# Patient Record
Sex: FEMALE | Race: WHITE | NOT HISPANIC OR LATINO | Employment: FULL TIME | URBAN - METROPOLITAN AREA
[De-identification: names, ages, dates, MRNs, and addresses within clinical notes are randomized per-mention and may not be internally consistent; named-entity substitution may affect disease eponyms.]

---

## 2023-06-07 LAB
ALANINE AMINOTRANSFERASE (SGPT) (U/L) IN SER/PLAS: 19 U/L (ref 7–45)
ALBUMIN (G/DL) IN SER/PLAS: 4.4 G/DL (ref 3.4–5)
ALKALINE PHOSPHATASE (U/L) IN SER/PLAS: 61 U/L (ref 33–110)
ANION GAP IN SER/PLAS: 11 MMOL/L (ref 10–20)
ASPARTATE AMINOTRANSFERASE (SGOT) (U/L) IN SER/PLAS: 18 U/L (ref 9–39)
BASOPHILS (10*3/UL) IN BLOOD BY AUTOMATED COUNT: 0.02 X10E9/L (ref 0–0.1)
BASOPHILS/100 LEUKOCYTES IN BLOOD BY AUTOMATED COUNT: 0.4 % (ref 0–2)
BILIRUBIN TOTAL (MG/DL) IN SER/PLAS: 0.7 MG/DL (ref 0–1.2)
C PEPTIDE (NG/ML) IN SER/PLAS: 2.3 NG/ML (ref 0.7–3.9)
CALCIDIOL (25 OH VITAMIN D3) (NG/ML) IN SER/PLAS: 31 NG/ML
CALCIUM (MG/DL) IN SER/PLAS: 9.7 MG/DL (ref 8.6–10.6)
CARBON DIOXIDE, TOTAL (MMOL/L) IN SER/PLAS: 28 MMOL/L (ref 21–32)
CHLORIDE (MMOL/L) IN SER/PLAS: 105 MMOL/L (ref 98–107)
CHOLESTEROL (MG/DL) IN SER/PLAS: 173 MG/DL (ref 0–199)
CHOLESTEROL IN HDL (MG/DL) IN SER/PLAS: 91.6 MG/DL
CHOLESTEROL/HDL RATIO: 1.9
COBALAMIN (VITAMIN B12) (PG/ML) IN SER/PLAS: 303 PG/ML (ref 211–911)
CREATININE (MG/DL) IN SER/PLAS: 0.62 MG/DL (ref 0.5–1.05)
EOSINOPHILS (10*3/UL) IN BLOOD BY AUTOMATED COUNT: 0.13 X10E9/L (ref 0–0.7)
EOSINOPHILS/100 LEUKOCYTES IN BLOOD BY AUTOMATED COUNT: 2.5 % (ref 0–6)
ERYTHROCYTE DISTRIBUTION WIDTH (RATIO) BY AUTOMATED COUNT: 13.5 % (ref 11.5–14.5)
ERYTHROCYTE MEAN CORPUSCULAR HEMOGLOBIN CONCENTRATION (G/DL) BY AUTOMATED: 31 G/DL (ref 32–36)
ERYTHROCYTE MEAN CORPUSCULAR VOLUME (FL) BY AUTOMATED COUNT: 88 FL (ref 80–100)
ERYTHROCYTES (10*6/UL) IN BLOOD BY AUTOMATED COUNT: 4.74 X10E12/L (ref 4–5.2)
ESTIMATED AVERAGE GLUCOSE FOR HBA1C: 97 MG/DL
FERRITIN (UG/LL) IN SER/PLAS: 15 UG/L (ref 8–150)
FOLATE (NG/ML) IN SER/PLAS: 14.9 NG/ML
GFR FEMALE: >90 ML/MIN/1.73M2
GLUCOSE (MG/DL) IN SER/PLAS: 78 MG/DL (ref 74–99)
HEMATOCRIT (%) IN BLOOD BY AUTOMATED COUNT: 41.6 % (ref 36–46)
HEMOGLOBIN (G/DL) IN BLOOD: 12.9 G/DL (ref 12–16)
HEMOGLOBIN A1C/HEMOGLOBIN TOTAL IN BLOOD: 5 %
IMMATURE GRANULOCYTES/100 LEUKOCYTES IN BLOOD BY AUTOMATED COUNT: 0.2 % (ref 0–0.9)
IRON (UG/DL) IN SER/PLAS: 116 UG/DL (ref 35–150)
IRON BINDING CAPACITY (UG/DL) IN SER/PLAS: 521 UG/DL (ref 240–445)
IRON SATURATION (%) IN SER/PLAS: 22 % (ref 25–45)
LDL: 70 MG/DL (ref 0–99)
LEUKOCYTES (10*3/UL) IN BLOOD BY AUTOMATED COUNT: 5.1 X10E9/L (ref 4.4–11.3)
LYMPHOCYTES (10*3/UL) IN BLOOD BY AUTOMATED COUNT: 1.46 X10E9/L (ref 1.2–4.8)
LYMPHOCYTES/100 LEUKOCYTES IN BLOOD BY AUTOMATED COUNT: 28.5 % (ref 13–44)
MONOCYTES (10*3/UL) IN BLOOD BY AUTOMATED COUNT: 0.35 X10E9/L (ref 0.1–1)
MONOCYTES/100 LEUKOCYTES IN BLOOD BY AUTOMATED COUNT: 6.8 % (ref 2–10)
NEUTROPHILS (10*3/UL) IN BLOOD BY AUTOMATED COUNT: 3.15 X10E9/L (ref 1.2–7.7)
NEUTROPHILS/100 LEUKOCYTES IN BLOOD BY AUTOMATED COUNT: 61.6 % (ref 40–80)
NRBC (PER 100 WBCS) BY AUTOMATED COUNT: 0 /100 WBC (ref 0–0)
PARATHYRIN INTACT (PG/ML) IN SER/PLAS: 46.2 PG/ML (ref 18.5–88)
PLATELETS (10*3/UL) IN BLOOD AUTOMATED COUNT: 262 X10E9/L (ref 150–450)
POTASSIUM (MMOL/L) IN SER/PLAS: 4.6 MMOL/L (ref 3.5–5.3)
PROTEIN TOTAL: 7.1 G/DL (ref 6.4–8.2)
SODIUM (MMOL/L) IN SER/PLAS: 139 MMOL/L (ref 136–145)
TRIGLYCERIDE (MG/DL) IN SER/PLAS: 59 MG/DL (ref 0–149)
UREA NITROGEN (MG/DL) IN SER/PLAS: 14 MG/DL (ref 6–23)
VLDL: 12 MG/DL (ref 0–40)

## 2023-06-10 LAB
COPPER: 123.2 UG/DL (ref 80–155)
ZINC,SERUM OR PLASMA: 64.8 UG/DL (ref 60–120)

## 2023-06-12 LAB
VITAMIN B1, WHOLE BLOOD: 112 NMOL/L (ref 70–180)
VITAMIN B6: 31.2 NMOL/L (ref 20–125)
VITAMIN K: 0.41 NMOL/L (ref 0.22–4.88)

## 2024-04-29 ENCOUNTER — TELEPHONE (OUTPATIENT)
Dept: SURGERY | Facility: CLINIC | Age: 44
End: 2024-04-29
Payer: COMMERCIAL

## 2024-04-29 NOTE — TELEPHONE ENCOUNTER
Non-surgical patient looking to schedule with MAY Dianelys Laureano, will try calling the patient again at a later time.

## 2024-05-02 ENCOUNTER — OFFICE VISIT (OUTPATIENT)
Dept: SURGERY | Facility: CLINIC | Age: 44
End: 2024-05-02
Payer: COMMERCIAL

## 2024-05-02 VITALS
OXYGEN SATURATION: 98 % | WEIGHT: 160.38 LBS | SYSTOLIC BLOOD PRESSURE: 133 MMHG | BODY MASS INDEX: 26.72 KG/M2 | HEIGHT: 65 IN | HEART RATE: 80 BPM | RESPIRATION RATE: 16 BRPM | DIASTOLIC BLOOD PRESSURE: 82 MMHG | TEMPERATURE: 97 F

## 2024-05-02 DIAGNOSIS — Z86.39 H/O: OBESITY: ICD-10-CM

## 2024-05-02 DIAGNOSIS — K91.2 INTESTINAL MALABSORPTION FOLLOWING GASTRECTOMY (HHS-HCC): Primary | ICD-10-CM

## 2024-05-02 DIAGNOSIS — Z98.84 S/P GASTRIC BYPASS: ICD-10-CM

## 2024-05-02 DIAGNOSIS — Z90.3 INTESTINAL MALABSORPTION FOLLOWING GASTRECTOMY (HHS-HCC): Primary | ICD-10-CM

## 2024-05-02 DIAGNOSIS — E66.3 OVERWEIGHT (BMI 25.0-29.9): ICD-10-CM

## 2024-05-02 DIAGNOSIS — R63.2 POLYPHAGIA: ICD-10-CM

## 2024-05-02 PROCEDURE — 99214 OFFICE O/P EST MOD 30 MIN: CPT

## 2024-05-02 RX ORDER — SEMAGLUTIDE 0.5 MG/.5ML
0.5 INJECTION, SOLUTION SUBCUTANEOUS
Qty: 2 ML | Refills: 0 | Status: SHIPPED
Start: 2024-05-05 | End: 2024-05-23 | Stop reason: DRUGHIGH

## 2024-05-02 NOTE — PATIENT INSTRUCTIONS
Congratulations on your weight loss!    The following are the recommendations we discussed at your appointment today:    1. Nutrition  - Please see the dietician BEFORE our next visit.   (You will need to have this visit before we can add weight loss medications).  * Dietitian Information:   * Robin/Cass: Hodan: [phone]  * Mary Jo: Lisa: [phone]  * Anabela: Rafia: [phone]  - Aim for 60 grams of protein and 60 oz of water daily.  - Follow Pouch Rules; handout given today.   - Eat 5 servings of fruits and veggies daily.  - Eat 3 small meals and 1-2 healthy, protein rich, snacks per day.  - Aim for 1456-0814 calories per day  - Aim for <50 grams of carbs per day  - Keep a food diary and log every single thing you eat, every day.    2. Exercise Recommendations:   - Aim for 30 minutes per day, 5 days per week to start, with progression over several weeks to more vigorous intensity.   - Emphasize increasing duration, rather than intensity initially.   - Moderate-intensity physical activity includes:  * Brisk Walking  * Biking (slower than 10 MPH)  * Water Aerobics  * Vigorous housework (washing windows, vacuuming, mopping)  * Mowing the lawn (push mower)  * Gardening  * Ballroom dancing    3. Fluids  - Drink at least 60 oz of water every day.   - Wait 30 minutes before or after meals to drink fluids.  - Avoid carbonated beverages.    4. Vitamins  - Remember to take your multivitamins 2 times daily, once in the morning and once in the evening  - Take your calcium 2-3 times daily, at least 2 hours apart from the multivitamin    5. Come to support groups!   - View the schedule to find a time and location.     6. Labs  - We will check labs today and call with abnormal values.   - We will send prescriptions for abnormal values.   - A copy of the results can be viewed on [facility] portal.   - If you are not on the [facility] health portal, a copy will be mailed to your home.    7. Anti-Obesity Medications  - Implement  "the above dietary and exercise recommendations  - Weight Loss Medication:  You have been prescribed Wegovy for weight loss.   Wegovy is a Glucagon-like peptide-1 (GLP-1) receptor agonist indicated as an adjunct to a reduced calorie diet and increased physical activity for chronic weight management in adults.   This medication works by decreasing the appetite and increased feeling of fullness.   Administer Wegovy once weekly, on the same day each week, at any time of day, with or without meals.   - Inject subcutaneously in the abdomen, thigh or upper arm.  - The initial dose for Wegovy is 0.25 mg weekly x 4 weeks.   - In four week intervals, your dose of Wegovy will be increased until a dose of 2.4mg weekly is achieved.       - Inject 0.25 mg once weekly X 4 weeks (week 1 - 4)     - Inject 0.5 mg once weekly X 4 weeks (week 5 - 8)     - Inject 1.0 mg once weekly X 4 weeks  (week 9 - 12)     - Inject 1.7 mg once weekly X 4 weeks  (week 13 - 16)     - Inject 2.4 mg once weekly X 4 weeks  (week 17 and on)   Possible Side Effects Include: Nausea, Diarrhea, Vomiting, Constipation, Abdominal Pain, Headache, Fatigue, Dyspepsia, Dizziness, Abdominal Distention, Hypoglycemia (in patients with Type II Diabetes), flatulence, gastroenteritis, and GERD.     When injecting the medication, find a place on your abdomen or thigh that you can pinch some adipose tissue.   Cleanse the area with soap and water or alcohol before injecting.  When you inject, make sure the window is facing you. As you press down you will hear an initial \"click.\" Watch the window to view the yellow plunger. Once the plunger has gone down, the pen will click a second time. Wait about 3 seconds after the second click before releasing pressure. Discard pen after each use.     Follow-up in 3 months.   To schedule an appointment call: [phone]           "

## 2024-05-02 NOTE — PROGRESS NOTES
BARIATRIC SURGERY CLINIC  FOLLOW UP NOTE      Name: Mary Campa  MRN: [id number]    Index Surgery  Date of Surgery: 07/25/2017   Surgeon: Anurag    Surgical Procedure: Laparoscopic aki en y gastric bypass 29179  Annual blood work: 06/7/23    HPI:   Presenting for follow up visit.  Was treated with Wegovy 2.4 X 1 year. She lost about 35-40 lbs.     Diet Regular (low carbs). Reports having 1400 marlene/day with 60-65 gr of protein, 140ish gr of carbs. Drinks around 1 gallon of water. Reports not maintaining 30-30-30 rule. Reports feeling restrictions with certain meals (pasta sits heavy in her stomach). Reports dumping couple times a year.    Exercise every day: walking [id number] steps a day, gym: cardio and weight training 3 times a week.    Concerns related to:  Nausea/Vomiting, Reflux: denies  Abdominal Pain: denies  Diarrhea/Constipation denies    DAILY SUPPLEMENTS:  Calcium: Calcium Citrate w/ vitamin D (1200 - 1500mg)  - w/o Vitamin D  Multivitamin & Minerals: 2 per day - Centrum women's MVI gummies 4 count  Vitamin B12: 500 mcg/day  - spray daily  Vitamin D3: 3000 units - denies  Iron/Other: Biotin  PPI:denies      Current Outpatient Medications   Medication Sig Dispense Refill    [START ON 5/5/2024] semaglutide, weight loss, (Wegovy) 0.5 mg/0.5 mL pen injector Inject 0.5 mg under the skin 1 (one) time per week for 28 days. Inject 0.5 mg subcutaneously every week 2 mL 0     No current facility-administered medications for this visit.       Comorbidities:  Patient Active Problem List   Diagnosis    H/O bariatric surgery    Intestinal malabsorption following gastrectomy ([facility])    Overweight (BMI 25.0-29.9)    Polyphagia    S/P gastric bypass    H/O: obesity         REVIEW OF SYSTEMS:  CONSTITUTIONAL: Patient denies fevers, chills, sweats and weight changes.  CARDIOVASCULAR: Patient denies chest pains, palpitations, orthopnea and paroxysmal nocturnal dyspnea.  RESPIRATORY: No dyspnea on exertion, no  "wheezing or cough.  GI: No nausea, vomiting, diarrhea, constipation, abdominal pain, hematochezia or melena.  MUSCULOSKELETAL: No myalgias or arthralgias.  NEUROLOGIC: No chronic headaches, no seizures. Patient denies numbness, tingling or weakness.  PSYCHIATRIC: Patient denies problems with mood disturbance. No problems with anxiety.  ENDOCRINE: No excessive urination or excessive thirst.  DERMATOLOGIC: Patient denies any rashes or skin changes.    PHYSICAL EXAM:  /82 (BP Location: Right arm, Patient Position: Sitting, BP Cuff Size: Adult)   Pulse 80   Temp 36.1 °C (97 °F)   Resp 16   Ht 1.651 m (5' 5\")   Wt 72.7 kg (160 lb 6.2 oz)   SpO2 98%   BMI 26.69 kg/m²   Alert, well appearing, no acute distress, nourished, hydrated.  Anicteric sclera, no ptosis  Facial symmetry  Neck supple  Unlabored respirations.  Easily conversant without increased respiratory effort  Oriented to person, place, time.  Judgement intact.  Appropriate mood, affect.       ASSESSMENT & PLAN:  43 y.o. female presenting for follow up visit s/p bariatric surgery.    Current Weight:     Wt Readings from Last 1 Encounters:   05/02/24 72.7 kg (160 lb 6.2 oz)       Diagnoses and all orders for this visit:  Intestinal malabsorption following gastrectomy ([facility])  -     Zinc, Serum or Plasma; Future  -     Vitamin B12; Future  -     Vitamin A; Future  -     Vitamin K; Future  -     TSH with reflex to Free T4 if abnormal; Future  -     Ferritin; Future  -     Vitamin B6; Future  -     Iron and TIBC; Future  -     Lipid Panel; Future  -     Comprehensive Metabolic Panel; Future  -     Vitamin D 25-Hydroxy,Total (for eval of Vitamin D levels); Future  -     Parathyroid Hormone, Intact; Future  -     Vitamin B1, Whole Blood; Future  -     Folate; Future  -     Copper, Blood; Future  -     CBC; Future  Overweight (BMI 25.0-29.9)  -     semaglutide, weight loss, (Wegovy) 0.5 mg/0.5 mL pen injector; Inject 0.5 mg under the skin 1 (one) time per " week for 28 days. Inject 0.5 mg subcutaneously every week  S/P gastric bypass  -     semaglutide, weight loss, (Wegovy) 0.5 mg/0.5 mL pen injector; Inject 0.5 mg under the skin 1 (one) time per week for 28 days. Inject 0.5 mg subcutaneously every week  Polyphagia  -     semaglutide, weight loss, (Wegovy) 0.5 mg/0.5 mL pen injector; Inject 0.5 mg under the skin 1 (one) time per week for 28 days. Inject 0.5 mg subcutaneously every week  H/O: obesity  -     semaglutide, weight loss, (Wegovy) 0.5 mg/0.5 mL pen injector; Inject 0.5 mg under the skin 1 (one) time per week for 28 days. Inject 0.5 mg subcutaneously every week      Doing well with diet and exercise.   Current BMI: 26  Recommend attending support groups    Mary used to be managed with Wegovy 2.4 mg once weekly. Denied any adverse effects.  However, her last dose administration was > 1 month ago.  Will have to re-start from a small dose 0.5 mg once weekly.  Denies personal or family hx of MTC or Pancreatitis.   Risks and benefits discussed.     > 50% of time spent counseling on the importance of following recommended dietary modifications including: role of diet, low calorie and carbohydrate restrictions, limiting fast food and avoiding high sugary beverages.   Also discussed, the role of exercise with an ultimate goal of at least 250-300 minutes a week for weight loss and weight maintenance.    Follow-up in 3 month.

## 2024-05-22 ENCOUNTER — PATIENT MESSAGE (OUTPATIENT)
Dept: SURGERY | Facility: CLINIC | Age: 44
End: 2024-05-22
Payer: COMMERCIAL

## 2024-05-22 DIAGNOSIS — Z86.39 H/O: OBESITY: Primary | ICD-10-CM

## 2024-05-22 DIAGNOSIS — E66.3 OVERWEIGHT (BMI 25.0-29.9): ICD-10-CM

## 2024-05-23 RX ORDER — SEMAGLUTIDE 1 MG/.5ML
1 INJECTION, SOLUTION SUBCUTANEOUS
Qty: 2 ML | Refills: 0 | Status: SHIPPED | OUTPATIENT
Start: 2024-05-26 | End: 2024-06-23

## 2024-05-29 ENCOUNTER — LAB (OUTPATIENT)
Dept: LAB | Facility: LAB | Age: 44
End: 2024-05-29
Payer: COMMERCIAL

## 2024-05-29 DIAGNOSIS — K91.2 INTESTINAL MALABSORPTION FOLLOWING GASTRECTOMY (HHS-HCC): ICD-10-CM

## 2024-05-29 DIAGNOSIS — Z90.3 INTESTINAL MALABSORPTION FOLLOWING GASTRECTOMY (HHS-HCC): ICD-10-CM

## 2024-05-29 LAB
25(OH)D3 SERPL-MCNC: 40 NG/ML (ref 30–100)
ALBUMIN SERPL BCP-MCNC: 4.3 G/DL (ref 3.4–5)
ALP SERPL-CCNC: 52 U/L (ref 33–110)
ALT SERPL W P-5'-P-CCNC: 22 U/L (ref 7–45)
ANION GAP SERPL CALC-SCNC: 14 MMOL/L (ref 10–20)
AST SERPL W P-5'-P-CCNC: 20 U/L (ref 9–39)
BILIRUB SERPL-MCNC: 0.4 MG/DL (ref 0–1.2)
BUN SERPL-MCNC: 14 MG/DL (ref 6–23)
CALCIUM SERPL-MCNC: 9.4 MG/DL (ref 8.6–10.6)
CHLORIDE SERPL-SCNC: 102 MMOL/L (ref 98–107)
CHOLEST SERPL-MCNC: 194 MG/DL (ref 0–199)
CHOLESTEROL/HDL RATIO: 2
CO2 SERPL-SCNC: 26 MMOL/L (ref 21–32)
CREAT SERPL-MCNC: 0.54 MG/DL (ref 0.5–1.05)
EGFRCR SERPLBLD CKD-EPI 2021: >90 ML/MIN/1.73M*2
ERYTHROCYTE [DISTWIDTH] IN BLOOD BY AUTOMATED COUNT: 14 % (ref 11.5–14.5)
FERRITIN SERPL-MCNC: 26 NG/ML (ref 8–150)
FOLATE SERPL-MCNC: 11.4 NG/ML
GLUCOSE SERPL-MCNC: 84 MG/DL (ref 74–99)
HCT VFR BLD AUTO: 36.9 % (ref 36–46)
HDLC SERPL-MCNC: 96.4 MG/DL
HGB BLD-MCNC: 11.2 G/DL (ref 12–16)
IRON SATN MFR SERPL: ABNORMAL %
IRON SERPL-MCNC: 43 UG/DL (ref 35–150)
LDLC SERPL CALC-MCNC: 87 MG/DL
MCH RBC QN AUTO: 25.4 PG (ref 26–34)
MCHC RBC AUTO-ENTMCNC: 30.4 G/DL (ref 32–36)
MCV RBC AUTO: 84 FL (ref 80–100)
NON HDL CHOLESTEROL: 98 MG/DL (ref 0–149)
NRBC BLD-RTO: 0 /100 WBCS (ref 0–0)
PLATELET # BLD AUTO: 335 X10*3/UL (ref 150–450)
POTASSIUM SERPL-SCNC: 3.9 MMOL/L (ref 3.5–5.3)
PROT SERPL-MCNC: 7.1 G/DL (ref 6.4–8.2)
PTH-INTACT SERPL-MCNC: 87.9 PG/ML (ref 18.5–88)
RBC # BLD AUTO: 4.41 X10*6/UL (ref 4–5.2)
SODIUM SERPL-SCNC: 138 MMOL/L (ref 136–145)
TIBC SERPL-MCNC: ABNORMAL UG/DL
TRIGL SERPL-MCNC: 54 MG/DL (ref 0–149)
TSH SERPL-ACNC: 1.05 MIU/L (ref 0.44–3.98)
UIBC SERPL-MCNC: >450 UG/DL (ref 110–370)
VIT B12 SERPL-MCNC: 206 PG/ML (ref 211–911)
VLDL: 11 MG/DL (ref 0–40)
WBC # BLD AUTO: 4.4 X10*3/UL (ref 4.4–11.3)

## 2024-05-29 PROCEDURE — 84207 ASSAY OF VITAMIN B-6: CPT

## 2024-05-29 PROCEDURE — 83540 ASSAY OF IRON: CPT

## 2024-05-29 PROCEDURE — 36415 COLL VENOUS BLD VENIPUNCTURE: CPT

## 2024-05-29 PROCEDURE — 80053 COMPREHEN METABOLIC PANEL: CPT

## 2024-05-29 PROCEDURE — 80061 LIPID PANEL: CPT

## 2024-05-29 PROCEDURE — 84597 ASSAY OF VITAMIN K: CPT

## 2024-05-29 PROCEDURE — 82746 ASSAY OF FOLIC ACID SERUM: CPT

## 2024-05-29 PROCEDURE — 83550 IRON BINDING TEST: CPT

## 2024-05-29 PROCEDURE — 84443 ASSAY THYROID STIM HORMONE: CPT

## 2024-05-29 PROCEDURE — 82607 VITAMIN B-12: CPT

## 2024-05-29 PROCEDURE — 82728 ASSAY OF FERRITIN: CPT

## 2024-05-29 PROCEDURE — 84425 ASSAY OF VITAMIN B-1: CPT

## 2024-05-29 PROCEDURE — 83970 ASSAY OF PARATHORMONE: CPT

## 2024-05-29 PROCEDURE — 82525 ASSAY OF COPPER: CPT

## 2024-05-29 PROCEDURE — 82306 VITAMIN D 25 HYDROXY: CPT

## 2024-05-29 PROCEDURE — 84590 ASSAY OF VITAMIN A: CPT

## 2024-05-29 PROCEDURE — 84630 ASSAY OF ZINC: CPT

## 2024-05-29 PROCEDURE — 85027 COMPLETE CBC AUTOMATED: CPT

## 2024-05-31 LAB
COPPER SERPL-MCNC: 120.2 UG/DL (ref 80–155)
ZINC SERPL-MCNC: 48 UG/DL (ref 60–120)

## 2024-06-02 LAB
PHYTONADIONE SERPL-MCNC: 0.3 NMOL/L (ref 0.22–4.88)
PYRIDOXAL PHOS SERPL-SCNC: 33.5 NMOL/L (ref 20–125)

## 2024-06-03 LAB
ANNOTATION COMMENT IMP: NORMAL
RETINYL PALMITATE SERPL-MCNC: <0.02 MG/L (ref 0–0.1)
VIT A SERPL-MCNC: 0.41 MG/L (ref 0.3–1.2)
VIT B1 PYROPHOSHATE BLD-SCNC: 93 NMOL/L (ref 70–180)

## 2024-06-14 DIAGNOSIS — D50.8 IRON DEFICIENCY ANEMIA AFTER GASTRECTOMY: Primary | ICD-10-CM

## 2024-06-14 DIAGNOSIS — K91.89 IRON DEFICIENCY ANEMIA AFTER GASTRECTOMY: Primary | ICD-10-CM

## 2024-06-14 DIAGNOSIS — E53.8 VITAMIN B12 DEFICIENCY: ICD-10-CM

## 2024-06-14 DIAGNOSIS — Z90.3 INTESTINAL MALABSORPTION FOLLOWING GASTRECTOMY (HHS-HCC): ICD-10-CM

## 2024-06-14 DIAGNOSIS — K91.2 INTESTINAL MALABSORPTION FOLLOWING GASTRECTOMY (HHS-HCC): ICD-10-CM

## 2024-06-21 ENCOUNTER — PATIENT MESSAGE (OUTPATIENT)
Dept: SURGERY | Facility: CLINIC | Age: 44
End: 2024-06-21
Payer: COMMERCIAL

## 2024-06-21 DIAGNOSIS — E66.3 OVERWEIGHT (BMI 25.0-29.9): Primary | ICD-10-CM

## 2024-06-27 DIAGNOSIS — Z86.39 H/O: OBESITY: ICD-10-CM

## 2024-06-27 DIAGNOSIS — E66.3 OVERWEIGHT (BMI 25.0-29.9): ICD-10-CM

## 2024-07-01 RX ORDER — SEMAGLUTIDE 1 MG/.5ML
INJECTION, SOLUTION SUBCUTANEOUS
Qty: 2 ML | Refills: 0 | OUTPATIENT
Start: 2024-07-01

## 2024-07-01 RX ORDER — SEMAGLUTIDE 1.7 MG/.75ML
1.7 INJECTION, SOLUTION SUBCUTANEOUS
Qty: 3 ML | Refills: 1 | Status: SHIPPED | OUTPATIENT
Start: 2024-07-01

## 2024-08-21 ENCOUNTER — TELEMEDICINE (OUTPATIENT)
Dept: SURGERY | Facility: CLINIC | Age: 44
End: 2024-08-21
Payer: COMMERCIAL

## 2024-08-21 VITALS — WEIGHT: 146 LBS | BODY MASS INDEX: 24.32 KG/M2 | HEIGHT: 65 IN

## 2024-08-21 DIAGNOSIS — E66.3 OVERWEIGHT (BMI 25.0-29.9): Primary | ICD-10-CM

## 2024-08-21 DIAGNOSIS — R63.2 POLYPHAGIA: ICD-10-CM

## 2024-08-21 DIAGNOSIS — D51.8 OTHER VITAMIN B12 DEFICIENCY ANEMIA: ICD-10-CM

## 2024-08-21 DIAGNOSIS — K91.2 INTESTINAL MALABSORPTION FOLLOWING GASTRECTOMY (HHS-HCC): ICD-10-CM

## 2024-08-21 DIAGNOSIS — D50.8 IRON DEFICIENCY ANEMIA FOLLOWING BARIATRIC SURGERY: ICD-10-CM

## 2024-08-21 DIAGNOSIS — Z98.84 S/P GASTRIC BYPASS: ICD-10-CM

## 2024-08-21 DIAGNOSIS — Z90.3 INTESTINAL MALABSORPTION FOLLOWING GASTRECTOMY (HHS-HCC): ICD-10-CM

## 2024-08-21 DIAGNOSIS — K95.89 IRON DEFICIENCY ANEMIA FOLLOWING BARIATRIC SURGERY: ICD-10-CM

## 2024-08-21 PROCEDURE — 99214 OFFICE O/P EST MOD 30 MIN: CPT | Mod: 95

## 2024-08-21 PROCEDURE — 3008F BODY MASS INDEX DOCD: CPT

## 2024-08-21 PROCEDURE — 99214 OFFICE O/P EST MOD 30 MIN: CPT

## 2024-08-21 RX ORDER — SEMAGLUTIDE 1.7 MG/.75ML
1.7 INJECTION, SOLUTION SUBCUTANEOUS
Qty: 3 ML | Refills: 2 | Status: SHIPPED | OUTPATIENT
Start: 2024-08-25 | End: 2024-11-17

## 2024-08-21 RX ORDER — CYANOCOBALAMIN 1000 UG/ML
1000 INJECTION, SOLUTION INTRAMUSCULAR; SUBCUTANEOUS
Qty: 1 ML | Refills: 11 | Status: SHIPPED | OUTPATIENT
Start: 2024-08-21 | End: 2025-08-16

## 2024-08-21 NOTE — PROGRESS NOTES
BARIATRIC SURGERY CLINIC  FOLLOW UP NOTE      Name: Mary Campa  MRN: [id number]    Index Surgery  Date of Surgery: 07/25/2017   Surgeon: Anurag    Surgical Procedure: Laparoscopic aki en y gastric bypass 97122  Annual blood work: 5/29/24, 06/7/23    HPI:   Presenting for follow up visit.  Was treated with Wegovy 2.4 X 1 year. She lost about 35-40 lbs.     Diet Regular (low carbs). Reports having 1400 marlene/day with 60-85 gr of protein, 140ish gr of carbs. Drinks around 1 gallon of water. Reports not maintaining 30-30-30 rule. Reports feeling restrictions with certain meals (pasta sits heavy in her stomach). Reports dumping couple times a year.    Exercise every day: walking 13449-92421 steps a day, gym: cardio and weight training 3 times a week.    Concerns related to:  Nausea/Vomiting, Reflux: denies  Abdominal Pain: denies  Diarrhea/Constipation denies    DAILY SUPPLEMENTS:  Calcium: Calcium Citrate w/ vitamin D (1200 - 1500mg)  - w/o Vitamin D  Multivitamin & Minerals: 2 per day - [employer] women's MVI gummies 4 count  Vitamin B12: 500 mcg/day  - spray daily  Vitamin D3: 3000 units - denies  Iron/Other: Biotin  PPI:denies      Current Outpatient Medications   Medication Sig Dispense Refill    multivitamin with minerals (multivitamin-iron-folic acid) tablet Take 1 tablet by mouth twice a day.      [START ON 8/25/2024] semaglutide, weight loss, (Wegovy) 1.7 mg/0.75 mL pen injector Inject 1.7 mg under the skin 1 (one) time per week. Inject 1.7 mg subcutaneously every week 3 mL 2     No current facility-administered medications for this visit.       Comorbidities:  Patient Active Problem List   Diagnosis    H/O bariatric surgery    Intestinal malabsorption following gastrectomy ([facility])    Overweight (BMI 25.0-29.9)    Polyphagia    S/P gastric bypass    H/O: obesity    Iron deficiency anemia following bariatric surgery         REVIEW OF SYSTEMS:  CONSTITUTIONAL: Patient denies fevers, chills, sweats and weight  "changes.  CARDIOVASCULAR: Patient denies chest pains, palpitations, orthopnea and paroxysmal nocturnal dyspnea.  RESPIRATORY: No dyspnea on exertion, no wheezing or cough.  GI: No nausea, vomiting, diarrhea, constipation, abdominal pain, hematochezia or melena.  MUSCULOSKELETAL: No myalgias or arthralgias.  NEUROLOGIC: No chronic headaches, no seizures. Patient denies numbness, tingling or weakness.  PSYCHIATRIC: Patient denies problems with mood disturbance. No problems with anxiety.  ENDOCRINE: No excessive urination or excessive thirst.  DERMATOLOGIC: Patient denies any rashes or skin changes.    PHYSICAL EXAM:  Ht 1.651 m (5' 5\")   Wt 66.2 kg (146 lb)   BMI 24.30 kg/m²   Alert, well appearing, no acute distress, nourished, hydrated.  Anicteric sclera, no ptosis  Facial symmetry  Neck supple  Unlabored respirations.  Easily conversant without increased respiratory effort  Oriented to person, place, time.  Judgement intact.  Appropriate mood, affect.       ASSESSMENT & PLAN:  43 y.o. female presenting for follow up visit s/p bariatric surgery.  Patient has been dealing with her ex- death, she has lost 5 lbs since 1 week ago, when he . Her weight loss is not that fast otherwise.  Also, her Adderall dose has been increased. Discussed with the patient the it also could have affected her appetite and caused more weight loss.   Latest Blood work has been reviewed and discussed with the patient.   Mary stated that her ex-'s  is scheduled for the same day as benign Hematology. Discussed that her Hgb is lowered (206)  and Iron Storages are empty. Recommended not to delay her benign Heme visit.     Current Weight: 146      Wt Readings from Last 1 Encounters:   24 66.2 kg (146 lb)       Diagnoses and all orders for this visit:  Overweight (BMI 25.0-29.9)  -     semaglutide, weight loss, (Wegovy) 1.7 mg/0.75 mL pen injector; Inject 1.7 mg under the skin 1 (one) time per week. Inject 1.7 " mg subcutaneously every week  S/P gastric bypass  Polyphagia  Iron deficiency anemia following bariatric surgery  Intestinal malabsorption following gastrectomy ([facility])      Doing well with diet and exercise.   Current BMI: 24  Recommend attending support groups    Mary used to be managed with Wegovy 2.4 mg once weekly. Denied any adverse effects.  However, her last dose administration was > 1 month ago.  Will have to re-start from a small dose 0.5 mg once weekly.  Currently on Wegovy 1.7 mg once weekly.  Denies any adverse effects.  Denies personal or family hx of MTC or Pancreatitis.   Risks and benefits discussed.     > 50% of time spent counseling on the importance of following recommended dietary modifications including: role of diet, low calorie and carbohydrate restrictions, limiting fast food and avoiding high sugary beverages.   Also discussed, the role of exercise with an ultimate goal of at least 250-300 minutes a week for weight loss and weight maintenance.    Follow-up in 1 month.

## 2024-08-29 ENCOUNTER — APPOINTMENT (OUTPATIENT)
Dept: HEMATOLOGY/ONCOLOGY | Facility: CLINIC | Age: 44
End: 2024-08-29
Payer: COMMERCIAL

## 2024-09-06 NOTE — PROGRESS NOTES
Subjective     Mary Campa is a 43 y.o. female who presents for the following: Skin Check.  She states she has not noticed any changes in any of her brown spots recently, including in size, shape, or color, and they are all asymptomatic with no associated bleeding, itching, or pain.  She notes recent pimple breakouts on her face.  She denies any new, changing, or concerning skin lesions since her last visit; no bleeding, itching, or burning lesions.      Review of Systems:  No other skin or systemic complaints other than what is documented elsewhere in the note.    The following portions of the chart were reviewed this encounter and updated as appropriate:       Skin Cancer History  No skin cancer on file.    Specialty Problems    None      Past Dermatologic / Past Relevant Medical History:    - history of severely atypical compound melanocytic proliferation, for which an invasive malignant melanoma arising in association with a nevus and extending to a Breslow depth of 1.2 mm, could not be excluded, on right mid back diagnosed on 5/4/21 s/p wide local excision with 1-cm margins and negative right axillary sentinel lymph node biopsy (0 out of 3 lymph nodes positive for melanoma) by Dr. Tristen Aguilar in surgical oncology on 7/23/21    Family History:    No family history of melanoma or skin cancer    Social History:    The patient works as a  for [employer] and as a     Allergies:  Latex    Current Medications / CAM's:    Current Outpatient Medications:     cyanocobalamin (Vitamin B-12) 1,000 mcg/mL injection, Inject 1 mL (1,000 mcg) into the muscle every 30 (thirty) days., Disp: 1 mL, Rfl: 11    methylphenidate (Ritalin) 10 mg tablet, Take by mouth once daily., Disp: , Rfl:     multivitamin with minerals (multivitamin-iron-folic acid) tablet, Take 1 tablet by mouth twice a day., Disp: , Rfl:     semaglutide, weight loss, (Wegovy) 1.7 mg/0.75 mL pen injector, Inject 1.7 mg  under the skin 1 (one) time per week. Inject 1.7 mg subcutaneously every week, Disp: 3 mL, Rfl: 2    clindamycin (Cleocin T) 1 % lotion, Apply topically 2 times a day., Disp: 60 mL, Rfl: 11     Objective   Well appearing patient in no apparent distress; mood and affect are within normal limits.    A full examination was performed including scalp, face, eyes, ears, nose, lips, neck, chest, axillae, abdomen, back, bilateral upper extremities, and bilateral lower extremities. All findings within normal limits unless otherwise noted below.    Assessment/Plan   1. Neoplasm of uncertain behavior of skin (3)  Left Upper Back  8 mm dark brown pigmented, asymmetric macule with an asymmetric pigment network and irregular borders           Shave removal    Lesion length (cm):  0.8  Margin per side (cm):  0.2  Lesion diameter (cm):  1.2  Informed consent: discussed and consent obtained    Timeout: patient name, date of birth, surgical site, and procedure verified    Procedure prep:  Patient was prepped and draped  Anesthesia: the lesion was anesthetized in a standard fashion    Anesthetic:  1% lidocaine w/ epinephrine 1-100,000 local infiltration  Instrument used: flexible razor blade    Hemostasis achieved with: aluminum chloride    Outcome: patient tolerated procedure well    Post-procedure details: sterile dressing applied and wound care instructions given    Dressing type: bandage and petrolatum      Staff Communication: Dermatology Local Anesthesia: 1 % Lidocaine / Epinephrine - Amount:0.5ml    Specimen 1 - Dermatopathology- DERM LAB  Differential Diagnosis: DN  Check Margins Yes/No?:    Comments:    Dermpath Lab: Routine Histopathology (formalin-fixed tissue)    Mid Back  4 mm dark brown pigmented, asymmetric macule with an asymmetric pigment network and irregular borders           Shave removal    Lesion length (cm):  0.4  Margin per side (cm):  0.2  Lesion diameter (cm):  0.8  Informed consent: discussed and consent  obtained    Timeout: patient name, date of birth, surgical site, and procedure verified    Procedure prep:  Patient was prepped and draped  Anesthesia: the lesion was anesthetized in a standard fashion    Anesthetic:  1% lidocaine w/ epinephrine 1-100,000 local infiltration  Instrument used: flexible razor blade    Hemostasis achieved with: aluminum chloride    Outcome: patient tolerated procedure well    Post-procedure details: sterile dressing applied and wound care instructions given    Dressing type: bandage and petrolatum      Staff Communication: Dermatology Local Anesthesia: 1 % Lidocaine / Epinephrine - Amount:0.5ml    Specimen 2 - Dermatopathology- DERM LAB  Differential Diagnosis: DN  Check Margins Yes/No?:    Comments:    Dermpath Lab: Routine Histopathology (formalin-fixed tissue)    Left Anterior Proximal Arm  7 mm dark brown pigmented, asymmetric macule with an asymmetric pigment network and irregular borders           Shave removal    Lesion length (cm):  0.7  Margin per side (cm):  0.2  Lesion diameter (cm):  1.1  Informed consent: discussed and consent obtained    Timeout: patient name, date of birth, surgical site, and procedure verified    Procedure prep:  Patient was prepped and draped  Anesthesia: the lesion was anesthetized in a standard fashion    Anesthetic:  1% lidocaine w/ epinephrine 1-100,000 local infiltration  Instrument used: flexible razor blade    Hemostasis achieved with: aluminum chloride    Outcome: patient tolerated procedure well    Post-procedure details: sterile dressing applied and wound care instructions given    Dressing type: bandage and petrolatum      Staff Communication: Dermatology Local Anesthesia: 1 % Lidocaine / Epinephrine - Amount:0.5ml    Specimen 3 - Dermatopathology- DERM LAB  Differential Diagnosis: DN  Check Margins Yes/No?:    Comments:    Dermpath Lab: Routine Histopathology (formalin-fixed tissue)    2. Melanocytic nevus of trunk  Scattered on the  patient's face, neck, trunk, and extremities, there are multiple small, round- to oval-shaped, brown-pigmented and pink-colored, symmetric, uniform-appearing macules and dome-shaped papules    Clinically benign- to slightly atypical-appearing nevi - the clinically benign- to slightly atypical-appearing nature of the remainder of the patient's nevi was discussed with the patient today.  None of the patient's nevi, with the exception of the 3 noted above, meet threshold for biopsy today.  I emphasized the importance of performing monthly self-skin exams using the ABCDs of monitoring moles, which were reviewed with the patient today and an informational hand-out provided.  I also emphasized the importance of sun avoidance and sun protection with daily sunscreen use.    3. Hemangioma of skin  Scattered on the patient's face, neck, trunk, and extremities, there are multiple small, round, cherry red- to purplish-colored, symmetric, uniform, vascular-appearing macules and papules    Cherry Angiomas - the benign nature of these vascular lesions was discussed with the patient today and reassurance provided.  No treatment is medically indicated for these lesions at this time.    4. Acne vulgaris  Head - Anterior (Face)  Scattered on the patient's face, there are a few open and closed comedones; several erythematous, inflammatory papules; and a few pink to hyperpigmented macules consistent with post-inflammatory hyperpigmentation    Acne Vulgaris -face; mild; mainly inflammatory type.  The nature of this condition and treatment options were discussed extensively with the patient today.  At this time, I recommend topical therapy with Clindamycin 1% lotion, which she was instructed to apply twice daily to the affected areas of her face or up to 3-4 times per day as needed for active lesions.  The risks, benefits, and side effects of this medication were discussed.  I also informed the patient it will likely take at least 2-3  months to notice any significant improvement with the treatment regimen outlined above.  She expressed understanding and is in agreement with this plan.    clindamycin (Cleocin T) 1 % lotion - Head - Anterior (Face)  Apply topically 2 times a day.    5. Personal history of malignant melanoma of skin  The sites of prior melanoma excision were examined.  There is no evidence of recurrent growth or pigmentation or recurrent disease, satellite papules, or nodules on exam today.    History of melanoma and photodamage.  There is no evidence of local, regional, or distant recurrent disease or any new primary melanomas on exam today.  I emphasized the importance of continuing to perform monthly self-skin and lymph node exams using the ABCDs of monitoring moles, which were reviewed with the patient, as well as the importance of sun avoidance and sun protection with daily sunscreen use.  I will have the patient return to our office in 6 to 12 months, pending the above biopsy results, for routine melanoma follow-up and total body skin exam, and the patient was instructed to call our office should the patient notice any new, changing, symptomatic, or otherwise concerning skin lesions before then.  The patient expressed understanding and is in agreement with this plan.    6. Diffuse photodamage of skin  Photodistributed  Diffuse photodamage with actinic changes with telangiectasia and mottled pigmentation in sun-exposed areas.    Photodamage.  The signs and symptoms of skin cancer were reviewed and the patient was advised to practice sun protection and sun avoidance, use daily sunscreen, and perform regular self skin exams.  Sun protection was discussed, including avoiding the mid-day sun, wearing a sunscreen with SPF at least 50, and stressing the need for reapplication of sunscreen and applying more than they think they need.

## 2024-09-10 ENCOUNTER — APPOINTMENT (OUTPATIENT)
Dept: DERMATOLOGY | Facility: CLINIC | Age: 44
End: 2024-09-10
Payer: COMMERCIAL

## 2024-09-10 DIAGNOSIS — D18.01 HEMANGIOMA OF SKIN: ICD-10-CM

## 2024-09-10 DIAGNOSIS — D48.5 NEOPLASM OF UNCERTAIN BEHAVIOR OF SKIN: Primary | ICD-10-CM

## 2024-09-10 DIAGNOSIS — Z85.820 PERSONAL HISTORY OF MALIGNANT MELANOMA OF SKIN: ICD-10-CM

## 2024-09-10 DIAGNOSIS — D22.5 MELANOCYTIC NEVUS OF TRUNK: ICD-10-CM

## 2024-09-10 DIAGNOSIS — L70.0 ACNE VULGARIS: ICD-10-CM

## 2024-09-10 DIAGNOSIS — L57.8 DIFFUSE PHOTODAMAGE OF SKIN: ICD-10-CM

## 2024-09-10 PROCEDURE — 99204 OFFICE O/P NEW MOD 45 MIN: CPT | Performed by: DERMATOLOGY

## 2024-09-10 PROCEDURE — 11301 SHAVE SKIN LESION 0.6-1.0 CM: CPT | Performed by: DERMATOLOGY

## 2024-09-10 PROCEDURE — 11302 SHAVE SKIN LESION 1.1-2.0 CM: CPT | Performed by: DERMATOLOGY

## 2024-09-10 RX ORDER — METHYLPHENIDATE HYDROCHLORIDE 10 MG/1
TABLET ORAL
COMMUNITY
Start: 2024-08-12 | End: 2024-11-10

## 2024-09-10 RX ORDER — CLINDAMYCIN PHOSPHATE 10 UG/ML
LOTION TOPICAL 2 TIMES DAILY
Qty: 60 ML | Refills: 11 | Status: SHIPPED | OUTPATIENT
Start: 2024-09-10 | End: 2025-09-10

## 2024-09-10 ASSESSMENT — DERMATOLOGY PATIENT ASSESSMENT
DO YOU USE SUNSCREEN: OCCASIONALLY
ARE YOU ON BIRTH CONTROL: NO
DO YOU USE A TANNING BED: NO
ARE YOU AN ORGAN TRANSPLANT RECIPIENT: NO
DO YOU HAVE ANY NEW OR CHANGING LESIONS: NO
DO YOU HAVE IRREGULAR MENSTRUAL CYCLES: NO
ARE YOU TRYING TO GET PREGNANT: NO

## 2024-09-10 ASSESSMENT — DERMATOLOGY QUALITY OF LIFE (QOL) ASSESSMENT: ARE THERE EXCLUSIONS OR EXCEPTIONS FOR THE QUALITY OF LIFE ASSESSMENT: NO

## 2024-09-10 ASSESSMENT — ITCH NUMERIC RATING SCALE: HOW SEVERE IS YOUR ITCHING?: 0

## 2024-09-13 ENCOUNTER — HOSPITAL ENCOUNTER (OUTPATIENT)
Dept: RADIOLOGY | Facility: EXTERNAL LOCATION | Age: 44
Discharge: HOME | End: 2024-09-13

## 2024-09-17 LAB
LAB AP ASR DISCLAIMER: NORMAL
LABORATORY COMMENT REPORT: NORMAL
PATH REPORT.FINAL DX SPEC: NORMAL
PATH REPORT.GROSS SPEC: NORMAL
PATH REPORT.MICROSCOPIC SPEC OTHER STN: NORMAL
PATH REPORT.RELEVANT HX SPEC: NORMAL
PATH REPORT.TOTAL CANCER: NORMAL

## 2024-10-31 ENCOUNTER — OFFICE VISIT (OUTPATIENT)
Dept: HEMATOLOGY/ONCOLOGY | Facility: CLINIC | Age: 44
End: 2024-10-31
Payer: COMMERCIAL

## 2024-10-31 VITALS
RESPIRATION RATE: 16 BRPM | OXYGEN SATURATION: 98 % | SYSTOLIC BLOOD PRESSURE: 124 MMHG | TEMPERATURE: 98.6 F | WEIGHT: 146.28 LBS | DIASTOLIC BLOOD PRESSURE: 79 MMHG | BODY MASS INDEX: 24.37 KG/M2 | HEART RATE: 90 BPM | HEIGHT: 65 IN

## 2024-10-31 DIAGNOSIS — D50.8 IRON DEFICIENCY ANEMIA AFTER GASTRECTOMY: ICD-10-CM

## 2024-10-31 DIAGNOSIS — D50.9 IRON DEFICIENCY ANEMIA, UNSPECIFIED IRON DEFICIENCY ANEMIA TYPE: Primary | ICD-10-CM

## 2024-10-31 DIAGNOSIS — K91.89 IRON DEFICIENCY ANEMIA AFTER GASTRECTOMY: ICD-10-CM

## 2024-10-31 DIAGNOSIS — K91.2 INTESTINAL MALABSORPTION FOLLOWING GASTRECTOMY (HHS-HCC): ICD-10-CM

## 2024-10-31 DIAGNOSIS — K90.9 IDIOPATHIC STEATORRHEA (HHS-HCC): ICD-10-CM

## 2024-10-31 DIAGNOSIS — E53.8 VITAMIN B12 DEFICIENCY: ICD-10-CM

## 2024-10-31 DIAGNOSIS — Z90.3 INTESTINAL MALABSORPTION FOLLOWING GASTRECTOMY (HHS-HCC): ICD-10-CM

## 2024-10-31 DIAGNOSIS — D50.0 IRON DEFICIENCY ANEMIA DUE TO CHRONIC BLOOD LOSS: ICD-10-CM

## 2024-10-31 LAB
ALBUMIN SERPL BCP-MCNC: 4.1 G/DL (ref 3.4–5)
ALP SERPL-CCNC: 51 U/L (ref 33–110)
ALT SERPL W P-5'-P-CCNC: 18 U/L (ref 7–45)
ANION GAP SERPL CALC-SCNC: 11 MMOL/L (ref 10–20)
AST SERPL W P-5'-P-CCNC: 19 U/L (ref 9–39)
BASOPHILS # BLD AUTO: 0.02 X10*3/UL (ref 0–0.1)
BASOPHILS NFR BLD AUTO: 0.4 %
BILIRUB SERPL-MCNC: 0.5 MG/DL (ref 0–1.2)
BUN SERPL-MCNC: 13 MG/DL (ref 6–23)
CALCIUM SERPL-MCNC: 9.2 MG/DL (ref 8.6–10.3)
CHLORIDE SERPL-SCNC: 104 MMOL/L (ref 98–107)
CO2 SERPL-SCNC: 25 MMOL/L (ref 21–32)
CREAT SERPL-MCNC: 0.56 MG/DL (ref 0.5–1.05)
EGFRCR SERPLBLD CKD-EPI 2021: >90 ML/MIN/1.73M*2
EOSINOPHIL # BLD AUTO: 0.04 X10*3/UL (ref 0–0.7)
EOSINOPHIL NFR BLD AUTO: 0.8 %
ERYTHROCYTE [DISTWIDTH] IN BLOOD BY AUTOMATED COUNT: 14.2 % (ref 11.5–14.5)
FERRITIN SERPL-MCNC: 17 NG/ML (ref 8–150)
GLUCOSE SERPL-MCNC: 82 MG/DL (ref 74–99)
HCT VFR BLD AUTO: 35.7 % (ref 36–46)
HGB BLD-MCNC: 11.1 G/DL (ref 12–16)
IMM GRANULOCYTES # BLD AUTO: 0 X10*3/UL (ref 0–0.7)
IMM GRANULOCYTES NFR BLD AUTO: 0 % (ref 0–0.9)
IRON SATN MFR SERPL: ABNORMAL %
IRON SERPL-MCNC: 32 UG/DL (ref 35–150)
LYMPHOCYTES # BLD AUTO: 1.43 X10*3/UL (ref 1.2–4.8)
LYMPHOCYTES NFR BLD AUTO: 28.7 %
MCH RBC QN AUTO: 25.2 PG (ref 26–34)
MCHC RBC AUTO-ENTMCNC: 31.1 G/DL (ref 32–36)
MCV RBC AUTO: 81 FL (ref 80–100)
MONOCYTES # BLD AUTO: 0.37 X10*3/UL (ref 0.1–1)
MONOCYTES NFR BLD AUTO: 7.4 %
NEUTROPHILS # BLD AUTO: 3.13 X10*3/UL (ref 1.2–7.7)
NEUTROPHILS NFR BLD AUTO: 62.7 %
PLATELET # BLD AUTO: 256 X10*3/UL (ref 150–450)
POTASSIUM SERPL-SCNC: 4.4 MMOL/L (ref 3.5–5.3)
PROT SERPL-MCNC: 7.1 G/DL (ref 6.4–8.2)
RBC # BLD AUTO: 4.41 X10*6/UL (ref 4–5.2)
SODIUM SERPL-SCNC: 136 MMOL/L (ref 136–145)
TIBC SERPL-MCNC: ABNORMAL UG/DL
UIBC SERPL-MCNC: >450 UG/DL (ref 110–370)
WBC # BLD AUTO: 5 X10*3/UL (ref 4.4–11.3)

## 2024-10-31 PROCEDURE — 80053 COMPREHEN METABOLIC PANEL: CPT | Performed by: PHYSICIAN ASSISTANT

## 2024-10-31 PROCEDURE — 82728 ASSAY OF FERRITIN: CPT | Performed by: PHYSICIAN ASSISTANT

## 2024-10-31 PROCEDURE — 82607 VITAMIN B-12: CPT | Mod: GEALAB | Performed by: PHYSICIAN ASSISTANT

## 2024-10-31 PROCEDURE — 83540 ASSAY OF IRON: CPT | Performed by: PHYSICIAN ASSISTANT

## 2024-10-31 PROCEDURE — 99205 OFFICE O/P NEW HI 60 MIN: CPT | Performed by: PHYSICIAN ASSISTANT

## 2024-10-31 PROCEDURE — 3008F BODY MASS INDEX DOCD: CPT | Performed by: PHYSICIAN ASSISTANT

## 2024-10-31 PROCEDURE — 99215 OFFICE O/P EST HI 40 MIN: CPT | Performed by: PHYSICIAN ASSISTANT

## 2024-10-31 PROCEDURE — 36415 COLL VENOUS BLD VENIPUNCTURE: CPT | Performed by: PHYSICIAN ASSISTANT

## 2024-10-31 PROCEDURE — 85025 COMPLETE CBC W/AUTO DIFF WBC: CPT | Performed by: PHYSICIAN ASSISTANT

## 2024-10-31 RX ORDER — FAMOTIDINE 10 MG/ML
20 INJECTION INTRAVENOUS ONCE AS NEEDED
Status: CANCELLED | OUTPATIENT
Start: 2024-11-07

## 2024-10-31 RX ORDER — ALBUTEROL SULFATE 0.83 MG/ML
3 SOLUTION RESPIRATORY (INHALATION) AS NEEDED
Status: CANCELLED | OUTPATIENT
Start: 2024-11-07

## 2024-10-31 RX ORDER — DIPHENHYDRAMINE HYDROCHLORIDE 50 MG/ML
50 INJECTION INTRAMUSCULAR; INTRAVENOUS AS NEEDED
Status: CANCELLED | OUTPATIENT
Start: 2024-11-07

## 2024-10-31 RX ORDER — EPINEPHRINE 0.3 MG/.3ML
0.3 INJECTION SUBCUTANEOUS EVERY 5 MIN PRN
Status: CANCELLED | OUTPATIENT
Start: 2024-11-07

## 2024-10-31 SDOH — ECONOMIC STABILITY: FOOD INSECURITY: WITHIN THE PAST 12 MONTHS, YOU WORRIED THAT YOUR FOOD WOULD RUN OUT BEFORE YOU GOT THE MONEY TO BUY MORE.: NEVER TRUE

## 2024-10-31 SDOH — ECONOMIC STABILITY: FOOD INSECURITY: WITHIN THE PAST 12 MONTHS, THE FOOD YOU BOUGHT JUST DIDN'T LAST AND YOU DIDN'T HAVE MONEY TO GET MORE.: NEVER TRUE

## 2024-10-31 ASSESSMENT — PAIN SCALES - GENERAL: PAINLEVEL_OUTOF10: 0-NO PAIN

## 2024-11-01 PROBLEM — D18.01 HEMANGIOMA OF SKIN AND SUBCUTANEOUS TISSUE: Status: ACTIVE | Noted: 2021-05-04

## 2024-11-01 PROBLEM — R87.619 ABNORMAL GLANDULAR PAPANICOLAOU SMEAR OF CERVIX: Status: ACTIVE | Noted: 2018-09-26

## 2024-11-01 PROBLEM — D22.5 MELANOCYTIC NEVI OF TRUNK: Status: ACTIVE | Noted: 2021-05-04

## 2024-11-01 PROBLEM — E66.811 CLASS 1 OBESITY: Status: ACTIVE | Noted: 2023-09-08

## 2024-11-01 PROBLEM — D64.9 ABSOLUTE ANEMIA: Status: ACTIVE | Noted: 2024-11-01

## 2024-11-01 PROBLEM — L85.3 DRY SKIN: Status: ACTIVE | Noted: 2024-11-01

## 2024-11-01 PROBLEM — L57.9 SKIN CHANGES DUE TO CHRONIC EXPOSURE TO NONIONIZING RADIATION, UNSPECIFIED: Status: ACTIVE | Noted: 2021-05-04

## 2024-11-01 PROBLEM — Z98.84 WEIGHT GAIN FOLLOWING GASTRIC BYPASS SURGERY: Status: ACTIVE | Noted: 2024-11-01

## 2024-11-01 PROBLEM — Z20.822 EXPOSURE TO COVID-19 VIRUS: Status: ACTIVE | Noted: 2024-11-01

## 2024-11-01 PROBLEM — D24.1 FIBROADENOMA OF BREAST, RIGHT: Status: ACTIVE | Noted: 2018-11-21

## 2024-11-01 PROBLEM — D22.39 MELANOCYTIC NEVI OF OTHER PARTS OF FACE: Status: ACTIVE | Noted: 2021-05-04

## 2024-11-01 PROBLEM — J32.9 SINUSITIS: Status: ACTIVE | Noted: 2024-11-01

## 2024-11-01 PROBLEM — F41.9 ANXIETY: Status: ACTIVE | Noted: 2024-11-01

## 2024-11-01 PROBLEM — D22.60 MELANOCYTIC NEVI OF UNSPECIFIED UPPER LIMB, INCLUDING SHOULDER: Status: ACTIVE | Noted: 2021-05-04

## 2024-11-01 PROBLEM — L81.4 OTHER MELANIN HYPERPIGMENTATION: Status: ACTIVE | Noted: 2021-05-04

## 2024-11-01 PROBLEM — L03.031 CELLULITIS OF TOE OF RIGHT FOOT: Status: ACTIVE | Noted: 2024-11-01

## 2024-11-01 PROBLEM — J18.9 PNEUMONIA: Status: ACTIVE | Noted: 2024-11-01

## 2024-11-01 PROBLEM — E61.1 LOW SERUM IRON: Status: ACTIVE | Noted: 2024-11-01

## 2024-11-01 PROBLEM — C43.59 MELANOMA OF BACK (MULTI): Status: ACTIVE | Noted: 2024-11-01

## 2024-11-01 PROBLEM — R63.5 WEIGHT GAIN FOLLOWING GASTRIC BYPASS SURGERY: Status: ACTIVE | Noted: 2024-11-01

## 2024-11-01 PROBLEM — D48.5 NEOPLASM OF UNCERTAIN BEHAVIOR OF SKIN: Status: ACTIVE | Noted: 2021-05-04

## 2024-11-01 PROBLEM — S90.851A FOREIGN BODY IN RIGHT FOOT: Status: ACTIVE | Noted: 2024-11-01

## 2024-11-01 PROBLEM — D22.4 MELANOCYTIC NEVI OF SCALP AND NECK: Status: ACTIVE | Noted: 2021-05-04

## 2024-11-01 PROBLEM — E55.9 VITAMIN D DEFICIENCY: Status: ACTIVE | Noted: 2024-11-01

## 2024-11-01 PROBLEM — M54.9 BACK PAIN: Status: ACTIVE | Noted: 2024-11-01

## 2024-11-01 PROBLEM — R87.611 ATYPICAL SQUAMOUS CELLS CANNOT EXCLUDE HIGH GRADE SQUAMOUS INTRAEPITHELIAL LESION ON CYTOLOGIC SMEAR OF CERVIX (ASC-H): Status: ACTIVE | Noted: 2018-09-26

## 2024-11-01 PROBLEM — M25.50 JOINT PAIN: Status: ACTIVE | Noted: 2024-11-01

## 2024-11-01 LAB — VIT B12 SERPL-MCNC: 418 PG/ML (ref 211–911)

## 2024-11-01 RX ORDER — LIRAGLUTIDE 6 MG/ML
3 INJECTION, SOLUTION SUBCUTANEOUS
COMMUNITY
Start: 2020-05-12

## 2024-11-01 RX ORDER — PHENTERMINE AND TOPIRAMATE 7.5; 46 MG/1; MG/1
1 CAPSULE, EXTENDED RELEASE ORAL DAILY
COMMUNITY
Start: 2021-12-03

## 2024-11-01 RX ORDER — PSYLLIUM HUSK 0.4 G
1 CAPSULE ORAL DAILY
COMMUNITY
Start: 2018-02-21

## 2024-11-01 RX ORDER — ASPIRIN 325 MG
50000 TABLET, DELAYED RELEASE (ENTERIC COATED) ORAL 2 TIMES WEEKLY
COMMUNITY
Start: 2018-03-02

## 2024-11-01 RX ORDER — BENZONATATE 200 MG/1
1 CAPSULE ORAL 3 TIMES DAILY PRN
COMMUNITY
Start: 2020-10-17

## 2024-11-01 RX ORDER — BROMPHENIRAMINE MALEATE, PSEUDOEPHEDRINE HYDROCHLORIDE, AND DEXTROMETHORPHAN HYDROBROMIDE 2; 30; 10 MG/5ML; MG/5ML; MG/5ML
5 SYRUP ORAL
COMMUNITY
Start: 2024-03-20

## 2024-11-01 RX ORDER — BIOTIN 1 MG
1 TABLET ORAL DAILY
COMMUNITY

## 2024-11-04 RX ORDER — HEPARIN 100 UNIT/ML
500 SYRINGE INTRAVENOUS AS NEEDED
OUTPATIENT
Start: 2024-11-07

## 2024-11-04 RX ORDER — DIPHENHYDRAMINE HYDROCHLORIDE 50 MG/ML
50 INJECTION INTRAMUSCULAR; INTRAVENOUS AS NEEDED
Status: CANCELLED | OUTPATIENT
Start: 2024-11-07

## 2024-11-04 RX ORDER — ALBUTEROL SULFATE 0.83 MG/ML
3 SOLUTION RESPIRATORY (INHALATION) AS NEEDED
Status: CANCELLED | OUTPATIENT
Start: 2024-11-07

## 2024-11-04 RX ORDER — EPINEPHRINE 0.3 MG/.3ML
0.3 INJECTION SUBCUTANEOUS EVERY 5 MIN PRN
Status: CANCELLED | OUTPATIENT
Start: 2024-11-07

## 2024-11-04 RX ORDER — HEPARIN SODIUM,PORCINE/PF 10 UNIT/ML
50 SYRINGE (ML) INTRAVENOUS AS NEEDED
OUTPATIENT
Start: 2024-11-07

## 2024-11-04 RX ORDER — FAMOTIDINE 10 MG/ML
20 INJECTION INTRAVENOUS ONCE AS NEEDED
Status: CANCELLED | OUTPATIENT
Start: 2024-11-07

## 2024-11-05 NOTE — PROGRESS NOTES
"Reason for Visit  Mary Campa is a 44 y.o. female w/PMH s/f gastric bypass referred by Jenny Frank CNP for MARIBETH.    PMH/PSH: gastric bypass in 7/2017, ADD/ADHD, YOLY  FH: Sister with Thyroid cancer  Soc Hx: Denies smoking, ETOH, illicit drugs; works for [employer], 2 children.     History of Present Illness:  On assessment, reports fatigue, use to get Feraheme but stopped due to Covid. Menses normal. Notes bruises at times but improves after iron improves.    Review of Systems: All of the systems have been reviewed and are negative for complaints except what is stated in the HPI and/or past medical history.    Allergies and Intolerances:  Allergies   Allergen Reactions    Latex Hives    Morphine Other     Patient c/o \"feeling hot and anxiety\" after morphine post partum.     Outpatient Medication Profile:  Current Outpatient Medications   Medication Sig Dispense Refill    cyanocobalamin (Vitamin B-12) 1,000 mcg/mL injection Inject 1 mL (1,000 mcg) into the muscle every 30 (thirty) days. 1 mL 11    methylphenidate (Ritalin) 10 mg tablet Take by mouth once daily.      multivitamin with minerals (multivitamin-iron-folic acid) tablet Take 1 tablet by mouth twice a day.      semaglutide, weight loss, (Wegovy) 1.7 mg/0.75 mL pen injector Inject 1.7 mg under the skin 1 (one) time per week. Inject 1.7 mg subcutaneously every week 3 mL 2    benzonatate (Tessalon) 200 mg capsule Take 1 capsule (200 mg) by mouth 3 times a day as needed.      biotin 1 mg tablet Take 1 tablet (1 mg) by mouth once daily.      brompheniramine-pseudoeph-DM 2-30-10 mg/5 mL syrup Take 5 mL by mouth.      calcium carbonate 600 mg calcium (1,500 mg) tablet Take 1 tablet (1,500 mg) by mouth once daily.      cholecalciferol (Vitamin D-3) 50,000 unit capsule Take 1 capsule (50,000 Units) by mouth 2 times a week.      liraglutide, weight loss, (Saxenda) 3 mg/0.5 mL (18 mg/3 mL) pen injector injection Inject 0.5 mL (3 mg) under the skin 1 " "(one) time per week.      phentermine-topiramate (Qsymia) 7.5-46 mg capsule, ER multiphase 24 hr Take 1 capsule by mouth once daily.       No current facility-administered medications for this visit.      Vitals and Measurements:       7/23/2021     1:45 PM 7/23/2021     1:54 PM 12/3/2021     9:26 AM 2/1/2022    12:03 PM 5/2/2024     2:23 PM 8/21/2024    11:00 AM 10/31/2024     2:00 PM   Vitals   Systolic  123  122 133  124   Diastolic  82  70 82  79   Heart Rate  73  83 80  90   Temp  36.6 °C (97.9 °F)  36.4 °C (97.6 °F) 36.1 °C (97 °F)  37 °C (98.6 °F)   Resp  16  16 16  16   Height (in) 1.676 m (5' 5.98\")  1.676 m (5' 6\") 1.676 m (5' 6\") 1.651 m (5' 5\") 1.651 m (5' 5\") 1.653 m (5' 5.08\")    Weight (lb) 180.78  188 192.25 160.38 146 146.28   BMI 29.19 kg/m2  30.34 kg/m2 31.03 kg/m2 26.69 kg/m2 24.3 kg/m2 24.28 kg/m2   BSA (m2) 1.95 m2  1.99 m2 2.02 m2 1.83 m2 1.74 m2 1.75 m2   Visit Report     Report  Report       Significant value     Physical Exam:   Constitutional: alert, awake and oriented, not in acute distress   HEENT: moist mucous membranes, normal nose   Neck: supple, no lymphadenopathy   EYES: PERRL, EOM intact, conjunctiva normal  Skin: no jaundice, rash or erythema  Neurological: AAOx3, no gross focal deficit   Psychiatric: normal mood and behavior     Lab Results:  Lab Results   Component Value Date    WBC 5.0 10/31/2024    NEUTROABS 3.13 10/31/2024    IGABSOL 0.00 10/31/2024    LYMPHSABS 1.43 10/31/2024    MONOSABS 0.37 10/31/2024    EOSABS 0.04 10/31/2024    BASOSABS 0.02 10/31/2024    RBC 4.41 10/31/2024    MCV 81 10/31/2024    MCHC 31.1 (L) 10/31/2024    HGB 11.1 (L) 10/31/2024    HCT 35.7 (L) 10/31/2024     10/31/2024     Lab Results   Component Value Date    CREATININE 0.56 10/31/2024    BUN 13 10/31/2024    EGFR >90 10/31/2024     10/31/2024    K 4.4 10/31/2024     10/31/2024    CO2 25 10/31/2024      Lab Results   Component Value Date    ALT 18 10/31/2024    AST 19 10/31/2024 "    ALKPHOS 51 10/31/2024    BILITOT 0.5 10/31/2024     Lab Results   Component Value Date    IRON 32 (L) 10/31/2024    TIBC  10/31/2024      Comment:      One or more of the analytes used in this calculation is outside of the analytical measurement range.      FERRITIN 17 10/31/2024      Lab Results   Component Value Date    XKPPDCUM22 418 10/31/2024      Assessment:    .44 y.o. female w/PMH s/f gastric bypass referred for MARIBETH.      Plan:    Reviewed and discussed lab, imaging, and pathology results with patient in detail as well as diagnosis, prognosis, and treatment options.    Will precert for Venofer 200 mg x 5 doses due to IVF shortage    Consider c-scope    F/U w/PCP    RTC in 2 months    Patient verbalized understanding, and all her questions were answered to her satisfaction    60 min spent with patient greater than 50 % of which was spent in consultation and coordination of care.

## 2024-11-06 DIAGNOSIS — E66.3 OVERWEIGHT (BMI 25.0-29.9): ICD-10-CM

## 2024-11-06 RX ORDER — SEMAGLUTIDE 1.7 MG/.75ML
1.7 INJECTION, SOLUTION SUBCUTANEOUS
Qty: 3 ML | Refills: 2 | Status: SHIPPED | OUTPATIENT
Start: 2024-11-10 | End: 2025-02-02

## 2024-11-07 ENCOUNTER — INFUSION (OUTPATIENT)
Dept: HEMATOLOGY/ONCOLOGY | Facility: CLINIC | Age: 44
End: 2024-11-07
Payer: COMMERCIAL

## 2024-11-07 VITALS
BODY MASS INDEX: 24.56 KG/M2 | TEMPERATURE: 97.2 F | WEIGHT: 147.93 LBS | RESPIRATION RATE: 15 BRPM | HEART RATE: 79 BPM | SYSTOLIC BLOOD PRESSURE: 119 MMHG | DIASTOLIC BLOOD PRESSURE: 80 MMHG

## 2024-11-07 DIAGNOSIS — D50.0 IRON DEFICIENCY ANEMIA DUE TO CHRONIC BLOOD LOSS: ICD-10-CM

## 2024-11-07 DIAGNOSIS — K90.9 IDIOPATHIC STEATORRHEA (HHS-HCC): ICD-10-CM

## 2024-11-07 PROCEDURE — 2500000004 HC RX 250 GENERAL PHARMACY W/ HCPCS (ALT 636 FOR OP/ED): Performed by: PHYSICIAN ASSISTANT

## 2024-11-07 PROCEDURE — 96374 THER/PROPH/DIAG INJ IV PUSH: CPT | Mod: INF

## 2024-11-07 RX ORDER — EPINEPHRINE 0.3 MG/.3ML
0.3 INJECTION SUBCUTANEOUS EVERY 5 MIN PRN
Status: DISCONTINUED | OUTPATIENT
Start: 2024-11-07 | End: 2024-11-07 | Stop reason: HOSPADM

## 2024-11-07 RX ORDER — ALBUTEROL SULFATE 0.83 MG/ML
3 SOLUTION RESPIRATORY (INHALATION) AS NEEDED
OUTPATIENT
Start: 2024-11-10

## 2024-11-07 RX ORDER — ALBUTEROL SULFATE 0.83 MG/ML
3 SOLUTION RESPIRATORY (INHALATION) AS NEEDED
Status: DISCONTINUED | OUTPATIENT
Start: 2024-11-07 | End: 2024-11-07 | Stop reason: HOSPADM

## 2024-11-07 RX ORDER — FAMOTIDINE 10 MG/ML
20 INJECTION INTRAVENOUS ONCE AS NEEDED
OUTPATIENT
Start: 2024-11-10

## 2024-11-07 RX ORDER — EPINEPHRINE 0.3 MG/.3ML
0.3 INJECTION SUBCUTANEOUS EVERY 5 MIN PRN
OUTPATIENT
Start: 2024-11-10

## 2024-11-07 RX ORDER — FAMOTIDINE 10 MG/ML
20 INJECTION INTRAVENOUS ONCE AS NEEDED
Status: DISCONTINUED | OUTPATIENT
Start: 2024-11-07 | End: 2024-11-07 | Stop reason: HOSPADM

## 2024-11-07 RX ORDER — DIPHENHYDRAMINE HYDROCHLORIDE 50 MG/ML
50 INJECTION INTRAMUSCULAR; INTRAVENOUS AS NEEDED
Status: DISCONTINUED | OUTPATIENT
Start: 2024-11-07 | End: 2024-11-07 | Stop reason: HOSPADM

## 2024-11-07 RX ORDER — DIPHENHYDRAMINE HYDROCHLORIDE 50 MG/ML
50 INJECTION INTRAMUSCULAR; INTRAVENOUS AS NEEDED
OUTPATIENT
Start: 2024-11-10

## 2024-11-07 ASSESSMENT — PAIN SCALES - GENERAL: PAINLEVEL_OUTOF10: 0-NO PAIN

## 2024-11-13 ENCOUNTER — INFUSION (OUTPATIENT)
Dept: HEMATOLOGY/ONCOLOGY | Facility: CLINIC | Age: 44
End: 2024-11-13
Payer: COMMERCIAL

## 2024-11-13 VITALS
DIASTOLIC BLOOD PRESSURE: 73 MMHG | OXYGEN SATURATION: 100 % | HEART RATE: 82 BPM | TEMPERATURE: 97.2 F | RESPIRATION RATE: 18 BRPM | SYSTOLIC BLOOD PRESSURE: 119 MMHG

## 2024-11-13 DIAGNOSIS — K90.9 IDIOPATHIC STEATORRHEA (HHS-HCC): ICD-10-CM

## 2024-11-13 DIAGNOSIS — D50.0 IRON DEFICIENCY ANEMIA DUE TO CHRONIC BLOOD LOSS: ICD-10-CM

## 2024-11-13 PROCEDURE — 96374 THER/PROPH/DIAG INJ IV PUSH: CPT | Mod: INF

## 2024-11-13 PROCEDURE — 2500000004 HC RX 250 GENERAL PHARMACY W/ HCPCS (ALT 636 FOR OP/ED): Performed by: PHYSICIAN ASSISTANT

## 2024-11-13 RX ORDER — ALBUTEROL SULFATE 0.83 MG/ML
3 SOLUTION RESPIRATORY (INHALATION) AS NEEDED
Status: DISCONTINUED | OUTPATIENT
Start: 2024-11-13 | End: 2024-11-13 | Stop reason: HOSPADM

## 2024-11-13 RX ORDER — EPINEPHRINE 0.3 MG/.3ML
0.3 INJECTION SUBCUTANEOUS EVERY 5 MIN PRN
OUTPATIENT
Start: 2024-11-16

## 2024-11-13 RX ORDER — HEPARIN 100 UNIT/ML
500 SYRINGE INTRAVENOUS AS NEEDED
Status: DISCONTINUED | OUTPATIENT
Start: 2024-11-13 | End: 2024-11-13 | Stop reason: HOSPADM

## 2024-11-13 RX ORDER — EPINEPHRINE 0.3 MG/.3ML
0.3 INJECTION SUBCUTANEOUS EVERY 5 MIN PRN
Status: DISCONTINUED | OUTPATIENT
Start: 2024-11-13 | End: 2024-11-13 | Stop reason: HOSPADM

## 2024-11-13 RX ORDER — HEPARIN 100 UNIT/ML
500 SYRINGE INTRAVENOUS AS NEEDED
OUTPATIENT
Start: 2024-11-13

## 2024-11-13 RX ORDER — FAMOTIDINE 10 MG/ML
20 INJECTION INTRAVENOUS ONCE AS NEEDED
OUTPATIENT
Start: 2024-11-16

## 2024-11-13 RX ORDER — DIPHENHYDRAMINE HYDROCHLORIDE 50 MG/ML
50 INJECTION INTRAMUSCULAR; INTRAVENOUS AS NEEDED
OUTPATIENT
Start: 2024-11-16

## 2024-11-13 RX ORDER — ALBUTEROL SULFATE 0.83 MG/ML
3 SOLUTION RESPIRATORY (INHALATION) AS NEEDED
OUTPATIENT
Start: 2024-11-16

## 2024-11-13 RX ORDER — DIPHENHYDRAMINE HYDROCHLORIDE 50 MG/ML
50 INJECTION INTRAMUSCULAR; INTRAVENOUS AS NEEDED
Status: DISCONTINUED | OUTPATIENT
Start: 2024-11-13 | End: 2024-11-13 | Stop reason: HOSPADM

## 2024-11-13 RX ORDER — FAMOTIDINE 10 MG/ML
20 INJECTION INTRAVENOUS ONCE AS NEEDED
Status: DISCONTINUED | OUTPATIENT
Start: 2024-11-13 | End: 2024-11-13 | Stop reason: HOSPADM

## 2024-11-13 RX ORDER — HEPARIN SODIUM,PORCINE/PF 10 UNIT/ML
50 SYRINGE (ML) INTRAVENOUS AS NEEDED
OUTPATIENT
Start: 2024-11-13

## 2024-11-13 RX ORDER — HEPARIN SODIUM,PORCINE/PF 10 UNIT/ML
50 SYRINGE (ML) INTRAVENOUS AS NEEDED
Status: DISCONTINUED | OUTPATIENT
Start: 2024-11-13 | End: 2024-11-13 | Stop reason: HOSPADM

## 2024-11-13 ASSESSMENT — PAIN SCALES - GENERAL
PAINLEVEL_OUTOF10: 0-NO PAIN
PAINLEVEL_OUTOF10: 0-NO PAIN

## 2024-11-14 ENCOUNTER — APPOINTMENT (OUTPATIENT)
Dept: HEMATOLOGY/ONCOLOGY | Facility: CLINIC | Age: 44
End: 2024-11-14
Payer: COMMERCIAL

## 2024-11-15 ENCOUNTER — TELEPHONE (OUTPATIENT)
Dept: SURGERY | Facility: CLINIC | Age: 44
End: 2024-11-15

## 2024-11-15 NOTE — TELEPHONE ENCOUNTER
Unable to reach patient, left voicemail message for patient to return the call.     Patient needs Wegovy FUV with MAY Jenny.

## 2024-11-20 ENCOUNTER — TELEMEDICINE (OUTPATIENT)
Dept: SURGERY | Facility: CLINIC | Age: 44
End: 2024-11-20
Payer: COMMERCIAL

## 2024-11-20 VITALS — HEIGHT: 65 IN | WEIGHT: 144 LBS | BODY MASS INDEX: 23.99 KG/M2

## 2024-11-20 DIAGNOSIS — R63.2 POLYPHAGIA: Primary | ICD-10-CM

## 2024-11-20 DIAGNOSIS — Z98.84 S/P GASTRIC BYPASS: ICD-10-CM

## 2024-11-20 DIAGNOSIS — E66.3 OVERWEIGHT (BMI 25.0-29.9): ICD-10-CM

## 2024-11-20 DIAGNOSIS — D50.9 IRON DEFICIENCY ANEMIA, UNSPECIFIED IRON DEFICIENCY ANEMIA TYPE: ICD-10-CM

## 2024-11-20 PROCEDURE — 99214 OFFICE O/P EST MOD 30 MIN: CPT

## 2024-11-20 PROCEDURE — 3008F BODY MASS INDEX DOCD: CPT

## 2024-11-20 PROCEDURE — 99214 OFFICE O/P EST MOD 30 MIN: CPT | Mod: 95

## 2024-11-20 RX ORDER — SEMAGLUTIDE 1.7 MG/.75ML
1.7 INJECTION, SOLUTION SUBCUTANEOUS
Qty: 3 ML | Refills: 2 | Status: SHIPPED | OUTPATIENT
Start: 2024-11-24 | End: 2025-02-16
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
## 2024-11-21 ENCOUNTER — INFUSION (OUTPATIENT)
Dept: HEMATOLOGY/ONCOLOGY | Facility: CLINIC | Age: 44
End: 2024-11-21
Payer: COMMERCIAL

## 2024-11-21 ENCOUNTER — SOCIAL WORK (OUTPATIENT)
Dept: CASE MANAGEMENT | Facility: HOSPITAL | Age: 44
End: 2024-11-21

## 2024-11-21 VITALS
BODY MASS INDEX: 24.19 KG/M2 | HEART RATE: 71 BPM | RESPIRATION RATE: 15 BRPM | WEIGHT: 145.72 LBS | SYSTOLIC BLOOD PRESSURE: 120 MMHG | DIASTOLIC BLOOD PRESSURE: 72 MMHG | TEMPERATURE: 97.7 F

## 2024-11-21 DIAGNOSIS — D50.0 IRON DEFICIENCY ANEMIA DUE TO CHRONIC BLOOD LOSS: ICD-10-CM

## 2024-11-21 DIAGNOSIS — K90.9 IDIOPATHIC STEATORRHEA (HHS-HCC): ICD-10-CM

## 2024-11-21 PROCEDURE — 2500000004 HC RX 250 GENERAL PHARMACY W/ HCPCS (ALT 636 FOR OP/ED): Performed by: PHYSICIAN ASSISTANT

## 2024-11-21 PROCEDURE — 96374 THER/PROPH/DIAG INJ IV PUSH: CPT | Mod: INF

## 2024-11-21 RX ORDER — FAMOTIDINE 10 MG/ML
20 INJECTION INTRAVENOUS ONCE AS NEEDED
OUTPATIENT
Start: 2024-11-22

## 2024-11-21 RX ORDER — ALBUTEROL SULFATE 0.83 MG/ML
3 SOLUTION RESPIRATORY (INHALATION) AS NEEDED
OUTPATIENT
Start: 2024-11-22

## 2024-11-21 RX ORDER — DIPHENHYDRAMINE HYDROCHLORIDE 50 MG/ML
50 INJECTION INTRAMUSCULAR; INTRAVENOUS AS NEEDED
OUTPATIENT
Start: 2024-11-22

## 2024-11-21 RX ORDER — DIPHENHYDRAMINE HYDROCHLORIDE 50 MG/ML
50 INJECTION INTRAMUSCULAR; INTRAVENOUS AS NEEDED
Status: DISCONTINUED | OUTPATIENT
Start: 2024-11-21 | End: 2024-11-21 | Stop reason: HOSPADM

## 2024-11-21 RX ORDER — FAMOTIDINE 10 MG/ML
20 INJECTION INTRAVENOUS ONCE AS NEEDED
Status: DISCONTINUED | OUTPATIENT
Start: 2024-11-21 | End: 2024-11-21 | Stop reason: HOSPADM

## 2024-11-21 RX ORDER — ALBUTEROL SULFATE 0.83 MG/ML
3 SOLUTION RESPIRATORY (INHALATION) AS NEEDED
Status: DISCONTINUED | OUTPATIENT
Start: 2024-11-21 | End: 2024-11-21 | Stop reason: HOSPADM

## 2024-11-21 RX ORDER — EPINEPHRINE 0.3 MG/.3ML
0.3 INJECTION SUBCUTANEOUS EVERY 5 MIN PRN
OUTPATIENT
Start: 2024-11-22

## 2024-11-21 RX ORDER — EPINEPHRINE 0.3 MG/.3ML
0.3 INJECTION SUBCUTANEOUS EVERY 5 MIN PRN
Status: DISCONTINUED | OUTPATIENT
Start: 2024-11-21 | End: 2024-11-21 | Stop reason: HOSPADM

## 2024-11-21 ASSESSMENT — PAIN SCALES - GENERAL: PAINLEVEL_OUTOF10: 0-NO PAIN
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
## 2024-11-25 ENCOUNTER — APPOINTMENT (OUTPATIENT)
Dept: HEMATOLOGY/ONCOLOGY | Facility: CLINIC | Age: 44
End: 2024-11-25
Payer: COMMERCIAL

## 2024-12-04 ENCOUNTER — INFUSION (OUTPATIENT)
Dept: HEMATOLOGY/ONCOLOGY | Facility: CLINIC | Age: 44
End: 2024-12-04
Payer: COMMERCIAL

## 2024-12-04 ENCOUNTER — SOCIAL WORK (OUTPATIENT)
Dept: CASE MANAGEMENT | Facility: HOSPITAL | Age: 44
End: 2024-12-04
Payer: COMMERCIAL

## 2024-12-04 VITALS
TEMPERATURE: 98.6 F | WEIGHT: 147 LBS | DIASTOLIC BLOOD PRESSURE: 76 MMHG | SYSTOLIC BLOOD PRESSURE: 111 MMHG | BODY MASS INDEX: 24.4 KG/M2 | OXYGEN SATURATION: 99 % | HEART RATE: 97 BPM | RESPIRATION RATE: 18 BRPM

## 2024-12-04 DIAGNOSIS — D50.0 IRON DEFICIENCY ANEMIA DUE TO CHRONIC BLOOD LOSS: ICD-10-CM

## 2024-12-04 DIAGNOSIS — K90.9 IDIOPATHIC STEATORRHEA (HHS-HCC): ICD-10-CM

## 2024-12-04 LAB
ERYTHROCYTE [DISTWIDTH] IN BLOOD BY AUTOMATED COUNT: 16.6 % (ref 11.5–14.5)
FERRITIN SERPL-MCNC: 117 NG/ML (ref 8–150)
HCT VFR BLD AUTO: 35.5 % (ref 36–46)
HGB BLD-MCNC: 11.2 G/DL (ref 12–16)
IRON SATN MFR SERPL: 17 % (ref 25–45)
IRON SERPL-MCNC: 69 UG/DL (ref 35–150)
MCH RBC QN AUTO: 27.1 PG (ref 26–34)
MCHC RBC AUTO-ENTMCNC: 31.5 G/DL (ref 32–36)
MCV RBC AUTO: 86 FL (ref 80–100)
NRBC BLD-RTO: ABNORMAL /100{WBCS}
PLATELET # BLD AUTO: 243 X10*3/UL (ref 150–450)
RBC # BLD AUTO: 4.14 X10*6/UL (ref 4–5.2)
TIBC SERPL-MCNC: 397 UG/DL (ref 240–445)
UIBC SERPL-MCNC: 328 UG/DL (ref 110–370)
WBC # BLD AUTO: 3.6 X10*3/UL (ref 4.4–11.3)

## 2024-12-04 PROCEDURE — 2500000004 HC RX 250 GENERAL PHARMACY W/ HCPCS (ALT 636 FOR OP/ED): Performed by: PHYSICIAN ASSISTANT

## 2024-12-04 PROCEDURE — 96374 THER/PROPH/DIAG INJ IV PUSH: CPT | Mod: INF

## 2024-12-04 PROCEDURE — 85027 COMPLETE CBC AUTOMATED: CPT

## 2024-12-04 PROCEDURE — 82728 ASSAY OF FERRITIN: CPT

## 2024-12-04 PROCEDURE — 83540 ASSAY OF IRON: CPT

## 2024-12-04 RX ORDER — DIPHENHYDRAMINE HYDROCHLORIDE 50 MG/ML
50 INJECTION INTRAMUSCULAR; INTRAVENOUS AS NEEDED
OUTPATIENT
Start: 2024-12-06

## 2024-12-04 RX ORDER — DIPHENHYDRAMINE HYDROCHLORIDE 50 MG/ML
50 INJECTION INTRAMUSCULAR; INTRAVENOUS AS NEEDED
Status: DISCONTINUED | OUTPATIENT
Start: 2024-12-04 | End: 2024-12-04 | Stop reason: HOSPADM

## 2024-12-04 RX ORDER — FAMOTIDINE 10 MG/ML
20 INJECTION INTRAVENOUS ONCE AS NEEDED
OUTPATIENT
Start: 2024-12-06

## 2024-12-04 RX ORDER — FAMOTIDINE 10 MG/ML
20 INJECTION INTRAVENOUS ONCE AS NEEDED
Status: DISCONTINUED | OUTPATIENT
Start: 2024-12-04 | End: 2024-12-04 | Stop reason: HOSPADM

## 2024-12-04 RX ORDER — EPINEPHRINE 0.3 MG/.3ML
0.3 INJECTION SUBCUTANEOUS EVERY 5 MIN PRN
OUTPATIENT
Start: 2024-12-06

## 2024-12-04 RX ORDER — ALBUTEROL SULFATE 0.83 MG/ML
3 SOLUTION RESPIRATORY (INHALATION) AS NEEDED
OUTPATIENT
Start: 2024-12-06

## 2024-12-04 RX ORDER — EPINEPHRINE 0.3 MG/.3ML
0.3 INJECTION SUBCUTANEOUS EVERY 5 MIN PRN
Status: DISCONTINUED | OUTPATIENT
Start: 2024-12-04 | End: 2024-12-04 | Stop reason: HOSPADM

## 2024-12-04 RX ORDER — ALBUTEROL SULFATE 0.83 MG/ML
3 SOLUTION RESPIRATORY (INHALATION) AS NEEDED
Status: DISCONTINUED | OUTPATIENT
Start: 2024-12-04 | End: 2024-12-04 | Stop reason: HOSPADM

## 2024-12-04 ASSESSMENT — PAIN SCALES - GENERAL
PAINLEVEL_OUTOF10: 0-NO PAIN
PAINLEVEL_OUTOF10: 0-NO PAIN

## 2024-12-04 NOTE — PROGRESS NOTES
GOPAL met with patient today at [facility]. GOPAL student also present with permission. Eulalio was in a shared room. Patient expressed she had not found alternative living arrangements but is doing fine. GOPAL provided contact information and offered resources.

## 2024-12-12 ENCOUNTER — SOCIAL WORK (OUTPATIENT)
Dept: CASE MANAGEMENT | Facility: HOSPITAL | Age: 44
End: 2024-12-12

## 2024-12-12 ENCOUNTER — INFUSION (OUTPATIENT)
Dept: HEMATOLOGY/ONCOLOGY | Facility: CLINIC | Age: 44
End: 2024-12-12
Payer: COMMERCIAL

## 2024-12-12 VITALS
TEMPERATURE: 98.2 F | SYSTOLIC BLOOD PRESSURE: 141 MMHG | WEIGHT: 148.81 LBS | DIASTOLIC BLOOD PRESSURE: 76 MMHG | BODY MASS INDEX: 24.7 KG/M2 | HEART RATE: 102 BPM | RESPIRATION RATE: 18 BRPM | OXYGEN SATURATION: 100 %

## 2024-12-12 DIAGNOSIS — K90.9 IDIOPATHIC STEATORRHEA (HHS-HCC): ICD-10-CM

## 2024-12-12 DIAGNOSIS — D50.0 IRON DEFICIENCY ANEMIA DUE TO CHRONIC BLOOD LOSS: ICD-10-CM

## 2024-12-12 PROCEDURE — 96374 THER/PROPH/DIAG INJ IV PUSH: CPT | Mod: INF

## 2024-12-12 PROCEDURE — 2500000004 HC RX 250 GENERAL PHARMACY W/ HCPCS (ALT 636 FOR OP/ED): Performed by: PHYSICIAN ASSISTANT

## 2024-12-12 RX ORDER — DIPHENHYDRAMINE HYDROCHLORIDE 50 MG/ML
50 INJECTION INTRAMUSCULAR; INTRAVENOUS AS NEEDED
Status: DISCONTINUED | OUTPATIENT
Start: 2024-12-12 | End: 2024-12-12 | Stop reason: HOSPADM

## 2024-12-12 RX ORDER — HEPARIN 100 UNIT/ML
500 SYRINGE INTRAVENOUS AS NEEDED
Status: DISCONTINUED | OUTPATIENT
Start: 2024-12-12 | End: 2024-12-12 | Stop reason: HOSPADM

## 2024-12-12 RX ORDER — FAMOTIDINE 10 MG/ML
20 INJECTION INTRAVENOUS ONCE AS NEEDED
Status: DISCONTINUED | OUTPATIENT
Start: 2024-12-12 | End: 2024-12-12 | Stop reason: HOSPADM

## 2024-12-12 RX ORDER — EPINEPHRINE 0.3 MG/.3ML
0.3 INJECTION SUBCUTANEOUS EVERY 5 MIN PRN
OUTPATIENT
Start: 2024-12-13

## 2024-12-12 RX ORDER — ALBUTEROL SULFATE 0.83 MG/ML
3 SOLUTION RESPIRATORY (INHALATION) AS NEEDED
OUTPATIENT
Start: 2024-12-13

## 2024-12-12 RX ORDER — ALBUTEROL SULFATE 0.83 MG/ML
3 SOLUTION RESPIRATORY (INHALATION) AS NEEDED
Status: DISCONTINUED | OUTPATIENT
Start: 2024-12-12 | End: 2024-12-12 | Stop reason: HOSPADM

## 2024-12-12 RX ORDER — DIPHENHYDRAMINE HYDROCHLORIDE 50 MG/ML
50 INJECTION INTRAMUSCULAR; INTRAVENOUS AS NEEDED
OUTPATIENT
Start: 2024-12-13

## 2024-12-12 RX ORDER — EPINEPHRINE 0.3 MG/.3ML
0.3 INJECTION SUBCUTANEOUS EVERY 5 MIN PRN
Status: DISCONTINUED | OUTPATIENT
Start: 2024-12-12 | End: 2024-12-12 | Stop reason: HOSPADM

## 2024-12-12 RX ORDER — FAMOTIDINE 10 MG/ML
20 INJECTION INTRAVENOUS ONCE AS NEEDED
OUTPATIENT
Start: 2024-12-13

## 2024-12-12 RX ORDER — HEPARIN 100 UNIT/ML
500 SYRINGE INTRAVENOUS AS NEEDED
OUTPATIENT
Start: 2024-12-12

## 2024-12-12 RX ORDER — HEPARIN SODIUM,PORCINE/PF 10 UNIT/ML
50 SYRINGE (ML) INTRAVENOUS AS NEEDED
OUTPATIENT
Start: 2024-12-12

## 2024-12-12 RX ORDER — HEPARIN SODIUM,PORCINE/PF 10 UNIT/ML
50 SYRINGE (ML) INTRAVENOUS AS NEEDED
Status: DISCONTINUED | OUTPATIENT
Start: 2024-12-12 | End: 2024-12-12 | Stop reason: HOSPADM

## 2024-12-12 ASSESSMENT — PAIN SCALES - GENERAL: PAINLEVEL_OUTOF10: 0-NO PAIN

## 2024-12-12 NOTE — SIGNIFICANT EVENT
VSS, no side effects noted, patient did not want to stay the full 30 mins for the post observation period, RN instructed patient to go to the ED with any concerning symptoms.

## 2024-12-12 NOTE — PROGRESS NOTES
GOPAL met with patient at [facility] today. She reported she is ok at home and no worries and needing resources. Patient reported she only has plans for the holidays with family. Patient is hoping to feel better after her iron infusions. She reports not feeling well and being cold. SW offered support today.

## 2025-01-15 ENCOUNTER — APPOINTMENT (OUTPATIENT)
Dept: HEMATOLOGY/ONCOLOGY | Facility: CLINIC | Age: 45
End: 2025-01-15
Payer: COMMERCIAL

## 2025-02-06 ENCOUNTER — APPOINTMENT (OUTPATIENT)
Dept: DERMATOLOGY | Facility: CLINIC | Age: 45
End: 2025-02-06
Payer: COMMERCIAL

## 2025-02-06 DIAGNOSIS — D48.5 NEOPLASM OF UNCERTAIN BEHAVIOR OF SKIN: ICD-10-CM

## 2025-02-06 PROCEDURE — 11402 EXC TR-EXT B9+MARG 1.1-2 CM: CPT | Performed by: STUDENT IN AN ORGANIZED HEALTH CARE EDUCATION/TRAINING PROGRAM

## 2025-02-06 PROCEDURE — 12032 INTMD RPR S/A/T/EXT 2.6-7.5: CPT | Performed by: STUDENT IN AN ORGANIZED HEALTH CARE EDUCATION/TRAINING PROGRAM

## 2025-02-06 NOTE — PROGRESS NOTES
Excision Operative Note    Date of Surgery:  2/6/2025  Surgeon:  Adam Eldridge MD  Office Location: [id number] [facility] [address]  [facility] [address]  Dept: [phone]  Dept Fax: [phone]  Referring Provider: Joshua August MD  [address] [place] [address] Dr Baer [facility], [facility] [address]  [address]   Mary Campa is a 44 y.o. female who presents for the following: Excision for neoplasm of uncertain behavior of skin (atypical compound melanocytic proliferation) on the left anterior proximal arm.     According to the patient, the lesion has been present for approximately greater than 1 year at the time of diagnosis.  The lesion is not causing symptoms.  The lesion has not been treated previously.    The patient does not have a pacemaker / defibrillator.  The patient does not have a heart valve / joint replacement.    The patient is not on blood thinners.   The patient does not have a history of hepatitis B or C.  The patient does not have a history of HIV.  The patient does not have a history of immunosuppression (e.g. organ transplantation, malignancy, medications)    Is it okay to leave a phone message with results? yes  Who else may we leave results with: (name, relationship) n/a    The following portions of the chart were reviewed this encounter and updated as appropriate:         Assessment/Plan   Pre-procedure:   Obtained informed consent: written from patient  The surgical site was identified and confirmed with the patient.     Intra-operative:   Audible time out called at : 2:01 PM 02/06/25  by: Donna Leiva RN   Verified patient name, birthdate, site, specimen bottle label & requisition.    The planned procedure(s) was again reviewed with the patient. The risks of bleeding, infection, nerve damage and scarring were reviewed. The patient identity, surgical site, and planned procedure(s) were verified.     Biopsy Accession Number:  [id number]  Neoplasm of uncertain behavior of skin  Left Anterior Proximal Arm    Skin excision    Lesion length (cm):  0.6  Lesion width (cm):  0.6  Margin per side (cm):  0.5  Total excision diameter (cm):  1.6  Informed consent: discussed and consent obtained    Timeout: patient name, date of birth, surgical site, and procedure verified    Procedure prep:  Patient prepped in sterile fashion  Anesthesia: the lesion was anesthetized in a standard fashion    Anesthetic:  1% lidocaine w/ epinephrine 1-100,000 local infiltration  Instrument used: #15 blade    Hemostasis achieved with: electrodesiccation    Outcome: patient tolerated procedure well with no complications    Post-procedure details: sterile dressing applied and wound care instructions given    Dressing type: pressure dressing    Additional details:  The diagnosis was reviewed. The lesion has features concerning for the potential to progress to melanoma and re-excision was recommended by dermatopathology.     Re-excision was discussed with the patient. The risks, benefits and potential adverse effects were reviewed. Discussion included but was not limited to the cure rate, relative cost, wound care requirements, activity restrictions, likely scar outcome and time to heal were reviewed. The patient elected to proceed with excision.       Skin repair  Complexity:  Intermediate  Final length (cm):  3.6  Informed consent: discussed and consent obtained    Timeout: patient name, date of birth, surgical site, and procedure verified    Procedure prep:  Patient prepped in sterile fashion  Anesthesia: the lesion was anesthetized in a standard fashion    Anesthetic:  1% lidocaine w/ epinephrine 1-100,000 local infiltration  Reason for type of repair: reduce tension to allow closure    Undermining: edges undermined    Subcutaneous layers (deep stitches):   Suture size:  4-0  Suture type: Vicryl (polyglactin 910)    Stitches:  Buried vertical mattress  Fine/surface  layer approximation (top stitches):   Suture size:  6-0  Suture type: fast-absorbing plain gut    Stitches: simple running    Hemostasis achieved with: electrodesiccation  Outcome: patient tolerated procedure well with no complications    Post-procedure details: sterile dressing applied and wound care instructions given    Dressing type: pressure dressing      Staff Communication: Dermatology Local Anesthesia: 1 % Lidocaine / Epinephrine - Amount: 7cc    Specimen 1 - Dermatopathology- DERM LAB  Differential Diagnosis: Atypical Compound Melanocytic Proliferation ([id number])  Check Margins Yes/No?: Yes   Comments: 5 mm margins, indication stitch at the proximal tip  Dermpath Lab: Routine Histopathology (formalin-fixed tissue)      Intermediate Linear Repair:  Given the location and size of the defect, it was determined that an intermediate layered linear closure was required to restore normal anatomy and function. The repair is an intermediate closure as two layers of sutures were required. The defect was undermined extensively at the level of the subcutaneous plane. Standing cutaneous cones were removed using Burow's triangles. The wound edges were brought into close approximation with buried vertical mattress sutures. The remainder of the wound was then closed with epidermal top sutures.    The final repair measured 3.6 cm        Wound care was discussed, and the patient was given written post-operative wound care instructions.      The patient will follow up with Adam Eldridge MD as needed for any post operative problems or concerns, and will follow up with their primary dermatologist as scheduled.

## 2025-02-06 NOTE — LETTER
Mary was seen in office today for a procedure. Due to the nature of her procedure, she will need to avoid any heavy lifting (heavier than 15 pounds) for 10 days.    Please do not hesitate to reach out to my office with any questions or concerns at [phone].    Thank you,    Adam Eldridge MD

## 2025-02-10 LAB
LABORATORY COMMENT REPORT: NORMAL
PATH REPORT.FINAL DX SPEC: NORMAL
PATH REPORT.GROSS SPEC: NORMAL
PATH REPORT.MICROSCOPIC SPEC OTHER STN: NORMAL
PATH REPORT.RELEVANT HX SPEC: NORMAL
PATH REPORT.TOTAL CANCER: NORMAL

## 2025-03-04 ENCOUNTER — TELEMEDICINE (OUTPATIENT)
Dept: SURGERY | Facility: HOSPITAL | Age: 45
End: 2025-03-04
Payer: COMMERCIAL

## 2025-03-04 VITALS — HEIGHT: 65 IN | WEIGHT: 148 LBS | BODY MASS INDEX: 24.66 KG/M2

## 2025-03-04 DIAGNOSIS — Z98.84 WEIGHT GAIN FOLLOWING GASTRIC BYPASS SURGERY: ICD-10-CM

## 2025-03-04 DIAGNOSIS — Z98.84 S/P GASTRIC BYPASS: ICD-10-CM

## 2025-03-04 DIAGNOSIS — R63.2 POLYPHAGIA: ICD-10-CM

## 2025-03-04 DIAGNOSIS — E66.3 OVERWEIGHT (BMI 25.0-29.9): ICD-10-CM

## 2025-03-04 DIAGNOSIS — R63.5 WEIGHT GAIN FOLLOWING GASTRIC BYPASS SURGERY: ICD-10-CM

## 2025-03-04 DIAGNOSIS — D50.9 IRON DEFICIENCY ANEMIA, UNSPECIFIED IRON DEFICIENCY ANEMIA TYPE: Primary | ICD-10-CM

## 2025-03-04 PROCEDURE — 99214 OFFICE O/P EST MOD 30 MIN: CPT

## 2025-03-04 PROCEDURE — 3008F BODY MASS INDEX DOCD: CPT

## 2025-03-04 PROCEDURE — 99214 OFFICE O/P EST MOD 30 MIN: CPT | Mod: 95

## 2025-03-04 RX ORDER — PANTOPRAZOLE SODIUM 40 MG/1
40 TABLET, DELAYED RELEASE ORAL
Qty: 30 TABLET | Refills: 11 | Status: SHIPPED | OUTPATIENT
Start: 2025-03-04 | End: 2026-03-04

## 2025-03-04 RX ORDER — SEMAGLUTIDE 1.7 MG/.75ML
1.7 INJECTION, SOLUTION SUBCUTANEOUS
Qty: 3 ML | Refills: 2 | Status: SHIPPED | OUTPATIENT
Start: 2025-03-09 | End: 2025-06-01

## 2025-03-04 RX ORDER — METHYLPHENIDATE HYDROCHLORIDE 20 MG/1
1 CAPSULE, EXTENDED RELEASE ORAL
COMMUNITY
Start: 2025-02-05

## 2025-03-04 NOTE — PROGRESS NOTES
BARIATRIC SURGERY CLINIC  FOLLOW UP NOTE      Name: Mary Campa  MRN: [id number]    Index Surgery  Date of Surgery: 07/25/2017   Surgeon: Anurag    Surgical Procedure: Laparoscopic aki en y gastric bypass 58817  Annual blood work: 5/29/24, 06/7/23    Virtual or Telephone Consent    An interactive audio and video telecommunication system which permits real time communications between the patient (at the originating site) and provider (at the distant site) was utilized to provide this telehealth service.   Verbal consent was requested and obtained from Mary Campa on this date, 03/04/25 for a telehealth visit.       HPI:   Presenting for follow up visit.  Was treated with Wegovy 2.4 X 1 year. She lost about 35-40 lbs.     Diet Regular (low carbs). Reports having 1400 marlene/day with 60-85 gr of protein, 140ish gr of carbs. Drinks around 1 gallon of water. Reports not maintaining 30-30-30 rule. Reports feeling restrictions with certain meals (pasta sits heavy in her stomach). Reports dumping couple times a year.    Exercise every day: walking [id number] steps a day, gym: cardio and weight training 3 times a week.    Concerns related to:  Nausea/Vomiting, Reflux: denies  Abdominal Pain: denies  Diarrhea/Constipation denies    DAILY SUPPLEMENTS:  Calcium: Calcium Citrate w/ vitamin D (1200 - 1500mg)  - w/o Vitamin D  Multivitamin & Minerals: 2 per day - Centrum women's MVI gummies 4 count  Vitamin B12: 500 mcg/day  - spray daily  Vitamin D3: 3000 units - denies  Iron/Other: Biotin, also receives Iron Transfusion. Under Hematology care.   PPI:denies    Current Outpatient Medications   Medication Sig Dispense Refill    methylphenidate LA (Ritalin LA) 20 mg 24 hr capsule Take 1 capsule (20 mg) by mouth early in the morning..      biotin 1 mg tablet Take 1 tablet (1 mg) by mouth once daily.      calcium carbonate 600 mg calcium (1,500 mg) tablet Take 1 tablet (1,500 mg) by mouth once daily.      cyanocobalamin  (Vitamin B-12) 1,000 mcg/mL injection Inject 1 mL (1,000 mcg) into the muscle every 30 (thirty) days. 1 mL 11    methylphenidate (Ritalin) 10 mg tablet Take by mouth once daily.      pantoprazole (ProtoNix) 40 mg EC tablet Take 1 tablet (40 mg) by mouth once daily in the morning. Take before meals. Do not crush, chew, or split. 30 tablet 11    [START ON 3/9/2025] semaglutide, weight loss, (Wegovy) 1.7 mg/0.75 mL pen injector Inject 1.7 mg under the skin 1 (one) time per week. Inject 1.7 mg subcutaneously every week 3 mL 2     No current facility-administered medications for this visit.       Comorbidities:  Patient Active Problem List   Diagnosis    H/O bariatric surgery    Intestinal malabsorption following gastrectomy (HHS-HCC)    Overweight (BMI 25.0-29.9)    Polyphagia    S/P gastric bypass    H/O: obesity    Iron deficiency anemia    Idiopathic steatorrhea (HHS-HCC)    Abnormal glandular Papanicolaou smear of cervix    Atypical squamous cells cannot exclude high grade squamous intraepithelial lesion on cytologic smear of cervix (ASC-H)    Absolute anemia    Anxiety    Back pain    Cellulitis of toe of right foot    Dry skin    Exposure to COVID-19 virus    Foreign body in right foot    Fibroadenoma of breast, right    Hemangioma of skin and subcutaneous tissue    Joint pain    Low serum iron    Melanocytic nevi of scalp and neck    Melanocytic nevi of trunk    Melanocytic nevi of unspecified upper limb, including shoulder    Melanocytic nevi of other parts of face    Melanoma of back (Multi)    Neoplasm of uncertain behavior of skin    Class 1 obesity    Other melanin hyperpigmentation    Pneumonia    Weight gain following gastric bypass surgery    Sinusitis    Skin changes due to chronic exposure to nonionizing radiation, unspecified    Vitamin D deficiency         REVIEW OF SYSTEMS:  CONSTITUTIONAL: Patient denies fevers, chills, sweats and weight changes.  CARDIOVASCULAR: Patient denies chest pains,  "palpitations, orthopnea and paroxysmal nocturnal dyspnea.  RESPIRATORY: No dyspnea on exertion, no wheezing or cough.  GI: No nausea, vomiting, diarrhea, constipation, abdominal pain, hematochezia or melena.  MUSCULOSKELETAL: No myalgias or arthralgias.  NEUROLOGIC: No chronic headaches, no seizures. Patient denies numbness, tingling or weakness.  PSYCHIATRIC: Patient denies problems with mood disturbance. No problems with anxiety.  ENDOCRINE: No excessive urination or excessive thirst.  DERMATOLOGIC: Patient denies any rashes or skin changes.    PHYSICAL EXAM:  Ht 1.653 m (5' 5.08\")   Wt 67.1 kg (148 lb)   BMI 24.57 kg/m²   Alert, well appearing, no acute distress, nourished, hydrated.  Anicteric sclera, no ptosis  Facial symmetry  Neck supple  Unlabored respirations.  Easily conversant without increased respiratory effort  Oriented to person, place, time.  Judgement intact.  Appropriate mood, affect.       ASSESSMENT & PLAN:  44 y.o. female presenting for follow up visit s/p bariatric surgery.  Patient has been dealing with her ex- death, she has lost 5 lbs since 1 week ago, when he . Her weight loss is not that fast otherwise.  Also, her Adderall dose has been increased. Discussed with the patient the it also could have affected her appetite and caused more weight loss.   Latest Blood work has been reviewed and discussed with the patient.   Mary stated that her ex-'s  is scheduled for the same day as benign Hematology. Discussed that her Hgb is lowered (11.6)  and Iron Storages are empty. Recommended not to delay her benign Heme visit.   Patient has started her Iron Infusions. She had 2 out of 6 infusions. Patient states that she has started feeling better, with better energy. She stopped being cold all the time.     Current Weight: 144 lbs     Wt Readings from Last 1 Encounters:   25 67.1 kg (148 lb)       Diagnoses and all orders for this visit:  Iron deficiency anemia, " unspecified iron deficiency anemia type  -     pantoprazole (ProtoNix) 40 mg EC tablet; Take 1 tablet (40 mg) by mouth once daily in the morning. Take before meals. Do not crush, chew, or split.  -     Esophagogastroduodenoscopy (EGD); Future  Overweight (BMI 25.0-29.9)  -     semaglutide, weight loss, (Wegovy) 1.7 mg/0.75 mL pen injector; Inject 1.7 mg under the skin 1 (one) time per week. Inject 1.7 mg subcutaneously every week  S/P gastric bypass  -     semaglutide, weight loss, (Wegovy) 1.7 mg/0.75 mL pen injector; Inject 1.7 mg under the skin 1 (one) time per week. Inject 1.7 mg subcutaneously every week  -     pantoprazole (ProtoNix) 40 mg EC tablet; Take 1 tablet (40 mg) by mouth once daily in the morning. Take before meals. Do not crush, chew, or split.  -     Esophagogastroduodenoscopy (EGD); Future  Polyphagia  -     semaglutide, weight loss, (Wegovy) 1.7 mg/0.75 mL pen injector; Inject 1.7 mg under the skin 1 (one) time per week. Inject 1.7 mg subcutaneously every week  Weight gain following gastric bypass surgery      Doing well with diet and exercise.   Current BMI: 24  Recommend attending support groups    Mary used to be managed with Wegovy 2.4 mg once weekly. Denied any adverse effects.  However, her last dose administration was > 1 month ago.  Will have to re-start from a small dose 0.5 mg once weekly.  Currently on Wegovy 1.7 mg once weekly.  Denies any adverse effects.  Denies personal or family hx of MTC or Pancreatitis.   Risks and benefits discussed.   Will continue Wegovy at 1.7 mg     Patient's mother had stroke recently. Patient is affected by that. 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 She has been exhausted with that.     Reviewed  CBC and Iron Studies since the beginning of Iron Infusions. Hgb, MCHC not improving, still at 11.  RDW keeps rising, now at 16.6. Iron storage is slowly  improving    Pantoprazole is initiated  EGD ordered to exclude blood loss through upper GI.   Risk and benefits discussed.    > 50% of time spent counseling on the importance of following recommended dietary modifications including: role of diet, low calorie and carbohydrate restrictions, limiting fast food and avoiding high sugary beverages.   Also discussed, the role of exercise with an ultimate goal of at least 250-300 minutes a week for weight loss and weight maintenance.    Follow-up in 3 month.

## 2025-03-25 ENCOUNTER — APPOINTMENT (OUTPATIENT)
Dept: HEMATOLOGY/ONCOLOGY | Facility: CLINIC | Age: 45
End: 2025-03-25
Payer: COMMERCIAL

## 2025-04-17 ENCOUNTER — ANESTHESIA EVENT (OUTPATIENT)
Dept: OPERATING ROOM | Facility: HOSPITAL | Age: 45
End: 2025-04-17
Payer: COMMERCIAL

## 2025-04-17 RX ORDER — DROPERIDOL 2.5 MG/ML
0.62 INJECTION, SOLUTION INTRAMUSCULAR; INTRAVENOUS ONCE AS NEEDED
Status: CANCELLED | OUTPATIENT
Start: 2025-04-17

## 2025-04-17 RX ORDER — ONDANSETRON HYDROCHLORIDE 2 MG/ML
4 INJECTION, SOLUTION INTRAVENOUS ONCE AS NEEDED
Status: CANCELLED | OUTPATIENT
Start: 2025-04-17

## 2025-04-18 ENCOUNTER — ANESTHESIA (OUTPATIENT)
Dept: OPERATING ROOM | Facility: HOSPITAL | Age: 45
End: 2025-04-18
Payer: COMMERCIAL

## 2025-04-18 ENCOUNTER — HOSPITAL ENCOUNTER (OUTPATIENT)
Dept: OPERATING ROOM | Facility: HOSPITAL | Age: 45
Setting detail: OUTPATIENT SURGERY
Discharge: HOME | End: 2025-04-18
Payer: COMMERCIAL

## 2025-04-18 VITALS
SYSTOLIC BLOOD PRESSURE: 116 MMHG | OXYGEN SATURATION: 100 % | TEMPERATURE: 97.5 F | HEIGHT: 65 IN | HEART RATE: 71 BPM | RESPIRATION RATE: 15 BRPM | BODY MASS INDEX: 24.35 KG/M2 | DIASTOLIC BLOOD PRESSURE: 70 MMHG | WEIGHT: 146.16 LBS

## 2025-04-18 DIAGNOSIS — D50.9 IRON DEFICIENCY ANEMIA, UNSPECIFIED IRON DEFICIENCY ANEMIA TYPE: ICD-10-CM

## 2025-04-18 DIAGNOSIS — Z98.84 S/P GASTRIC BYPASS: ICD-10-CM

## 2025-04-18 DIAGNOSIS — R14.0 BLOATING: Primary | ICD-10-CM

## 2025-04-18 LAB — PREGNANCY TEST URINE, POC: NEGATIVE

## 2025-04-18 PROCEDURE — 7100000010 HC PHASE TWO TIME - EACH INCREMENTAL 1 MINUTE: Performed by: ANESTHESIOLOGY

## 2025-04-18 PROCEDURE — 43239 EGD BIOPSY SINGLE/MULTIPLE: CPT | Performed by: SURGERY

## 2025-04-18 PROCEDURE — 3700000002 HC GENERAL ANESTHESIA TIME - EACH INCREMENTAL 1 MINUTE: Performed by: ANESTHESIOLOGY

## 2025-04-18 PROCEDURE — 2500000004 HC RX 250 GENERAL PHARMACY W/ HCPCS (ALT 636 FOR OP/ED): Mod: JZ | Performed by: ANESTHESIOLOGY

## 2025-04-18 PROCEDURE — 3600000007 HC OR TIME - EACH INCREMENTAL 1 MINUTE - PROCEDURE LEVEL TWO: Performed by: ANESTHESIOLOGY

## 2025-04-18 PROCEDURE — 2500000004 HC RX 250 GENERAL PHARMACY W/ HCPCS (ALT 636 FOR OP/ED): Performed by: NURSE ANESTHETIST, CERTIFIED REGISTERED

## 2025-04-18 PROCEDURE — 3700000001 HC GENERAL ANESTHESIA TIME - INITIAL BASE CHARGE: Performed by: ANESTHESIOLOGY

## 2025-04-18 PROCEDURE — 3600000002 HC OR TIME - INITIAL BASE CHARGE - PROCEDURE LEVEL TWO: Performed by: ANESTHESIOLOGY

## 2025-04-18 PROCEDURE — 7100000009 HC PHASE TWO TIME - INITIAL BASE CHARGE: Performed by: ANESTHESIOLOGY

## 2025-04-18 PROCEDURE — 81025 URINE PREGNANCY TEST: CPT | Performed by: SURGERY

## 2025-04-18 PROCEDURE — 2500000005 HC RX 250 GENERAL PHARMACY W/O HCPCS: Performed by: NURSE ANESTHETIST, CERTIFIED REGISTERED

## 2025-04-18 RX ORDER — ASCORBIC ACID 500 MG
500 TABLET ORAL DAILY
Qty: 60 TABLET | Refills: 1 | Status: SHIPPED | OUTPATIENT
Start: 2025-04-18 | End: 2025-08-16

## 2025-04-18 RX ORDER — SODIUM CHLORIDE, SODIUM LACTATE, POTASSIUM CHLORIDE, CALCIUM CHLORIDE 600; 310; 30; 20 MG/100ML; MG/100ML; MG/100ML; MG/100ML
20 INJECTION, SOLUTION INTRAVENOUS CONTINUOUS
Status: DISCONTINUED | OUTPATIENT
Start: 2025-04-18 | End: 2025-04-19 | Stop reason: HOSPADM

## 2025-04-18 RX ORDER — LIDOCAINE HYDROCHLORIDE 20 MG/ML
SOLUTION OROPHARYNGEAL AS NEEDED
Status: DISCONTINUED | OUTPATIENT
Start: 2025-04-18 | End: 2025-04-18

## 2025-04-18 RX ORDER — MIDAZOLAM HYDROCHLORIDE 1 MG/ML
INJECTION, SOLUTION INTRAMUSCULAR; INTRAVENOUS AS NEEDED
Status: DISCONTINUED | OUTPATIENT
Start: 2025-04-18 | End: 2025-04-18

## 2025-04-18 RX ORDER — LIDOCAINE HYDROCHLORIDE 10 MG/ML
INJECTION, SOLUTION EPIDURAL; INFILTRATION; INTRACAUDAL; PERINEURAL AS NEEDED
Status: DISCONTINUED | OUTPATIENT
Start: 2025-04-18 | End: 2025-04-18

## 2025-04-18 RX ORDER — PROPOFOL 10 MG/ML
INJECTION, EMULSION INTRAVENOUS AS NEEDED
Status: DISCONTINUED | OUTPATIENT
Start: 2025-04-18 | End: 2025-04-18

## 2025-04-18 RX ADMIN — PROPOFOL 68.7 MG: 10 INJECTION, EMULSION INTRAVENOUS at 08:38

## 2025-04-18 RX ADMIN — LIDOCAINE HYDROCHLORIDE 20 MG: 10 SOLUTION INTRAVENOUS at 08:38

## 2025-04-18 RX ADMIN — MIDAZOLAM 2 MG: 1 INJECTION INTRAMUSCULAR; INTRAVENOUS at 08:30

## 2025-04-18 RX ADMIN — LIDOCAINE HYDROCHLORIDE 15 ML: 20 SOLUTION ORAL; TOPICAL at 08:30

## 2025-04-18 RX ADMIN — PROPOFOL 180 MCG/KG/MIN: 10 INJECTION, EMULSION INTRAVENOUS at 08:39

## 2025-04-18 RX ADMIN — SODIUM CHLORIDE, SODIUM LACTATE, POTASSIUM CHLORIDE, AND CALCIUM CHLORIDE 20 ML/HR: .6; .31; .03; .02 INJECTION, SOLUTION INTRAVENOUS at 08:00

## 2025-04-18 SDOH — HEALTH STABILITY: MENTAL HEALTH: CURRENT SMOKER: 0

## 2025-04-18 ASSESSMENT — PAIN SCALES - GENERAL
PAINLEVEL_OUTOF10: 0 - NO PAIN
PAIN_LEVEL: 0
PAINLEVEL_OUTOF10: 0 - NO PAIN

## 2025-04-18 ASSESSMENT — COLUMBIA-SUICIDE SEVERITY RATING SCALE - C-SSRS
2. HAVE YOU ACTUALLY HAD ANY THOUGHTS OF KILLING YOURSELF?: NO
1. IN THE PAST MONTH, HAVE YOU WISHED YOU WERE DEAD OR WISHED YOU COULD GO TO SLEEP AND NOT WAKE UP?: NO
6. HAVE YOU EVER DONE ANYTHING, STARTED TO DO ANYTHING, OR PREPARED TO DO ANYTHING TO END YOUR LIFE?: NO

## 2025-04-18 ASSESSMENT — PAIN - FUNCTIONAL ASSESSMENT: PAIN_FUNCTIONAL_ASSESSMENT: 0-10

## 2025-04-18 NOTE — DISCHARGE INSTRUCTIONS

## 2025-04-18 NOTE — ANESTHESIA PREPROCEDURE EVALUATION
Patient: Mary Campa    Procedure Information       Date/Time: 25 0830    Scheduled providers: Marcia Osborn MD MPH; Mik Buchanan MD    Procedure: EGD    Location: [facility] OR            Relevant Problems   Pulmonary   (+) Pneumonia      Neuro   (+) Anxiety      Hematology   (+) Absolute anemia   (+) Iron deficiency anemia      HEENT   (+) Sinusitis      ID   (+) Pneumonia       Clinical information reviewed:    Allergies  Meds     OB Status           NPO Detail:  NPO/Void Status  Date of Last Liquid: 25  Date of Last Solid: 25         Physical Exam    Airway  Mallampati: I  TM distance: >3 FB  Neck ROM: full  Mouth openin finger widths     Cardiovascular - normal exam   Dental    Pulmonary - normal exam   Abdominal - normal exam           Anesthesia Plan    History of general anesthesia?: yes  History of complications of general anesthesia?: no    ASA 2     MAC     The patient is not a current smoker.    Anesthetic plan and risks discussed with patient.    Plan discussed with CRNA and attending.

## 2025-04-18 NOTE — H&P
History Of Present Illness  Mary Campa is a 44 y.o. female presenting with history of a Aaron-en-Y gastric bypass in 2017 and a current BMI of roughly 25.  The patient has had some anemia despite iron infusions.  Iron stores are slowly improving.  Hemoglobin was at 11 on last check.  Presenting for upper endoscopy to exclude blood loss through the upper GI system in the setting of her bypass.         Past Medical History  Medical History[1]    Surgical History  Surgical History[2]     Social History  She reports that she has never smoked. She has never used smokeless tobacco. She reports that she does not drink alcohol and does not use drugs.    Family History  Family History[3]     Allergies  Latex and Morphine    Review of Systems  Constitutional:  Negative for chills and fever.   HENT:  Negative for facial swelling, nosebleeds, rhinorrhea and sinus pain.    Respiratory:  Negative for apnea, choking and chest tightness.    Cardiovascular:  Negative for chest pain, palpitations and leg swelling.   Gastrointestinal:  Negative for nausea and vomiting.   Endocrine: Negative for polydipsia, polyphagia and polyuria.   Genitourinary:  Negative for difficulty urinating, flank pain and urgency.   Skin:  Negative for color change, pallor and rash.   Neurological:  Negative for tremors, light-headedness and numbness.   Psychiatric/Behavioral:  Negative for agitation, behavioral problems, confusion, hallucinations and self-injury.        Physical Exam  Physical Exam:   Constitutional: Well developed, awake/alert/oriented x3, no distress, alert and cooperative  Eyes: PERRL, EOMI, clear sclera  Head/Neck: Neck supple, no apparent injury, No JVD, trachea midline  Respiratory/Thorax: good chest expansion, thorax symmetric  Cardiovascular: Regular, rate and rhythm,  2+ equal pulses of the extremities  Gastrointestinal: Nondistended, soft, minimally tender, no rebound tenderness or guarding, no masses  palpable  Musculoskeletal: ROM intact, no joint swelling, normal strength  Extremities: normal extremities, no cyanosis edema, contusions or wounds, no clubbing  Neurological: alert and oriented x3, intact senses,  Psychological: Appropriate mood and behavior  Skin: Warm and dry, no lesions, no rashes       Last Recorded Vitals  There were no vitals taken for this visit.    Relevant Results                 44-year-old female who is maintaining good weight loss after Aaron-en-Y gastric bypass in 2017 presenting for upper endoscopy in the setting of anemia..    I spent 60 minutes in the professional and overall care of this patient.      Qasim George MD         [1]   Past Medical History:  Diagnosis Date    Anxiety disorder, unspecified     Anxiety    Bariatric surgery status 02/21/2018    Bariatric surgery status    Contact with and (suspected) exposure to covid-19 11/30/2020    Exposure to COVID-19 virus    Encounter for other preprocedural examination 07/19/2017    Preoperative testing    Localized edema 08/29/2018    Lower leg edema    Morbid (severe) obesity due to excess calories (Multi) 09/20/2017    Morbid obesity with BMI of 45.0-49.9, adult    Noninfective gastroenteritis and colitis, unspecified 08/29/2018    Gastroenteritis, acute    Obesity, unspecified 04/26/2017    Adult-onset obesity    Other specified cough 10/17/2020    Productive cough    Pain in right leg 08/29/2018    Right leg pain    Personal history of other diseases of the circulatory system 08/29/2018    History of hypertension    Personal history of other diseases of the digestive system 08/29/2018    History of small bowel obstruction    Personal history of other diseases of the nervous system and sense organs 08/29/2018    History of obstructive sleep apnea    Personal history of other diseases of the respiratory system 10/17/2020    History of acute sinusitis    Personal history of other diseases of the respiratory system 06/18/2015     History of sinusitis    Personal history of pneumonia (recurrent) 2018    History of pneumonia    Thiamine deficiency, unspecified 2018    Thiamine deficiency    Xerosis cutis 2018    Dry skin   [2]   Past Surgical History:  Procedure Laterality Date     SECTION, LOW TRANSVERSE  2017     Section Low Transverse    GASTRIC BYPASS  2018    Gastric Surgery For Morbid Obesity Gastric Bypass   [3] No family history on file.

## 2025-04-18 NOTE — ANESTHESIA POSTPROCEDURE EVALUATION
Patient: Mary Campa    Procedure Summary       Date: 04/18/25 Room / Location: [facility] OR    Anesthesia Start: 0830 Anesthesia Stop: 0858    Procedure: EGD Diagnosis:       S/P gastric bypass      Iron deficiency anemia, unspecified iron deficiency anemia type    Scheduled Providers: Marcia Osborn MD MPH; Mik Buchanan MD Responsible Provider: Mik Buchanan MD    Anesthesia Type: MAC ASA Status: 2            Anesthesia Type: MAC    Vitals Value Taken Time   /70 04/18/25 09:12   Temp 36.4 °C (97.5 °F) 04/18/25 08:57   Pulse 71 04/18/25 09:12   Resp 15 04/18/25 09:12   SpO2 100 % 04/18/25 09:12       Anesthesia Post Evaluation    Patient location during evaluation: PACU  Patient participation: complete - patient participated  Level of consciousness: awake and alert  Pain score: 0  Pain management: adequate  Airway patency: patent  Cardiovascular status: acceptable  Respiratory status: acceptable  Hydration status: acceptable  Postoperative Nausea and Vomiting: none        No notable events documented.

## 2025-04-28 LAB
LABORATORY COMMENT REPORT: NORMAL
PATH REPORT.FINAL DX SPEC: NORMAL
PATH REPORT.GROSS SPEC: NORMAL
PATH REPORT.RELEVANT HX SPEC: NORMAL
PATH REPORT.TOTAL CANCER: NORMAL

## 2025-05-13 ENCOUNTER — TELEPHONE (OUTPATIENT)
Dept: SURGERY | Facility: CLINIC | Age: 45
End: 2025-05-13
Payer: COMMERCIAL

## 2025-05-13 NOTE — TELEPHONE ENCOUNTER
Patient has an EGD at [facility] with Dr Osborn on 4/18/25. Dr. Osborn wanted pt to get SIBO test. I noticed pt still has not scheduled that procedure. I called her. She said she has been busy. She will schedule it asap. I gave her the ph# for [facility].

## 2025-06-06 ENCOUNTER — TELEMEDICINE (OUTPATIENT)
Dept: SURGERY | Facility: HOSPITAL | Age: 45
End: 2025-06-06
Payer: COMMERCIAL

## 2025-06-06 VITALS — WEIGHT: 144 LBS | HEIGHT: 65 IN | BODY MASS INDEX: 23.99 KG/M2

## 2025-06-06 DIAGNOSIS — Z98.84 S/P GASTRIC BYPASS: ICD-10-CM

## 2025-06-06 DIAGNOSIS — K91.2 INTESTINAL MALABSORPTION FOLLOWING GASTRECTOMY (HHS-HCC): ICD-10-CM

## 2025-06-06 DIAGNOSIS — Z98.84 H/O BARIATRIC SURGERY: ICD-10-CM

## 2025-06-06 DIAGNOSIS — R63.2 POLYPHAGIA: Primary | ICD-10-CM

## 2025-06-06 DIAGNOSIS — Z90.3 INTESTINAL MALABSORPTION FOLLOWING GASTRECTOMY (HHS-HCC): ICD-10-CM

## 2025-06-06 DIAGNOSIS — Z86.39 H/O: OBESITY: ICD-10-CM

## 2025-06-06 DIAGNOSIS — E65 ABDOMINAL PANNICULUS, SYMPTOMATIC: ICD-10-CM

## 2025-06-06 PROCEDURE — 99214 OFFICE O/P EST MOD 30 MIN: CPT | Mod: 95

## 2025-06-06 PROCEDURE — 99214 OFFICE O/P EST MOD 30 MIN: CPT

## 2025-06-06 PROCEDURE — 3008F BODY MASS INDEX DOCD: CPT

## 2025-06-06 RX ORDER — SEMAGLUTIDE 1 MG/.5ML
1 INJECTION, SOLUTION SUBCUTANEOUS
Qty: 2 ML | Refills: 1 | Status: SHIPPED | OUTPATIENT
Start: 2025-06-08

## 2025-06-06 RX ORDER — NYSTATIN 100000 [USP'U]/G
1 POWDER TOPICAL 2 TIMES DAILY
Qty: 30 G | Refills: 1 | Status: SHIPPED | OUTPATIENT
Start: 2025-06-06 | End: 2026-06-06

## 2025-06-06 NOTE — PROGRESS NOTES
BARIATRIC SURGERY CLINIC  FOLLOW UP NOTE      Name: Mary Campa  MRN: [id number]    Index Surgery  Date of Surgery: 07/25/2017   Surgeon: Anurag    Surgical Procedure: Laparoscopic aki en y gastric bypass 11905  Annual blood work: 5/29/24, 06/7/23    Virtual or Telephone Consent    An interactive audio and video telecommunication system which permits real time communications between the patient (at the originating site) and provider (at the distant site) was utilized to provide this telehealth service.   Verbal consent was requested and obtained from Mary Campa on this date, 06/06/25 for a telehealth visit.       HPI:   Presenting for follow up visit.  Was treated with Wegovy 2.4 X 1 year. She lost about 35-40 lbs.     Diet Regular (low carbs). Reports having 1400 marlene/day with 60-85 gr of protein, 140ish gr of carbs. Drinks around 1 gallon of water. Reports not maintaining 30-30-30 rule. Reports feeling restrictions with certain meals (pasta sits heavy in her stomach). Reports dumping couple times a year.    Exercise every day: walking [phone] steps a day, gym: cardio and weight training 3 times a week.    Concerns related to:  Nausea/Vomiting, Reflux: denies  Abdominal Pain: denies  Diarrhea/Constipation denies    DAILY SUPPLEMENTS:  Calcium: Calcium Citrate w/ vitamin D (1200 - 1500mg)  - w/o Vitamin D  Multivitamin & Minerals: 2 per day - [employer] women's MVI gummies 4 count  Vitamin B12: 500 mcg/day  - spray daily  Vitamin D3: 3000 units - denies  Iron/Other: Biotin, also receives Iron Transfusion. Under Hematology care.   PPI:denies    Current Outpatient Medications   Medication Sig Dispense Refill    ascorbic acid (Vitamin C) 500 mg tablet Take 1 tablet (500 mg) by mouth once daily. 60 tablet 1    biotin 1 mg tablet Take 1 tablet (1 mg) by mouth once daily.      calcium carbonate 600 mg calcium (1,500 mg) tablet Take 1 tablet (1,500 mg) by mouth once daily.      cyanocobalamin (Vitamin  B-12) 1,000 mcg/mL injection Inject 1 mL (1,000 mcg) into the muscle every 30 (thirty) days. 1 mL 11    methylphenidate (Ritalin) 10 mg tablet Take by mouth once daily.      methylphenidate LA (Ritalin LA) 20 mg 24 hr capsule Take 1 capsule (20 mg) by mouth early in the morning..      pantoprazole (ProtoNix) 40 mg EC tablet Take 1 tablet (40 mg) by mouth once daily in the morning. Take before meals. Do not crush, chew, or split. 30 tablet 11    semaglutide, weight loss, (Wegovy) 1.7 mg/0.75 mL pen injector Inject 1.7 mg under the skin 1 (one) time per week. Inject 1.7 mg subcutaneously every week 3 mL 2     No current facility-administered medications for this visit.       Comorbidities:  Patient Active Problem List   Diagnosis    H/O bariatric surgery    Intestinal malabsorption following gastrectomy ([facility]HS-HCC)    Overweight (BMI 25.0-29.9)    Polyphagia    S/P gastric bypass    H/O: obesity    Iron deficiency anemia    Idiopathic steatorrhea (HHS-HCC)    Abnormal glandular Papanicolaou smear of cervix    Atypical squamous cells cannot exclude high grade squamous intraepithelial lesion on cytologic smear of cervix ([facility]-H)    Absolute anemia    Anxiety    Back pain    Cellulitis of toe of right foot    Dry skin    Exposure to COVID-19 virus    Foreign body in right foot    Fibroadenoma of breast, right    Hemangioma of skin and subcutaneous tissue    Joint pain    Low serum iron    Melanocytic nevi of scalp and neck    Melanocytic nevi of trunk    Melanocytic nevi of unspecified upper limb, including shoulder    Melanocytic nevi of other parts of face    Melanoma of back (Multi)    Neoplasm of uncertain behavior of skin    Class 1 obesity    Other melanin hyperpigmentation    Pneumonia    Weight gain following gastric bypass surgery    Sinusitis    Skin changes due to chronic exposure to nonionizing radiation, unspecified    Vitamin D deficiency         REVIEW OF SYSTEMS:  CONSTITUTIONAL: Patient denies fevers,  chills, sweats and weight changes.  CARDIOVASCULAR: Patient denies chest pains, palpitations, orthopnea and paroxysmal nocturnal dyspnea.  RESPIRATORY: No dyspnea on exertion, no wheezing or cough.  GI: No nausea, vomiting, diarrhea, constipation, abdominal pain, hematochezia or melena.  MUSCULOSKELETAL: No myalgias or arthralgias.  NEUROLOGIC: No chronic headaches, no seizures. Patient denies numbness, tingling or weakness.  PSYCHIATRIC: Patient denies problems with mood disturbance. No problems with anxiety.  ENDOCRINE: No excessive urination or excessive thirst.  DERMATOLOGIC: Patient denies any rashes or skin changes.    PHYSICAL EXAM:  There were no vitals taken for this visit.  Alert, well appearing, no acute distress, nourished, hydrated.  Anicteric sclera, no ptosis  Facial symmetry  Neck supple  Unlabored respirations.  Easily conversant without increased respiratory effort  Oriented to person, place, time.  Judgement intact.  Appropriate mood, affect.       ASSESSMENT & PLAN:  44 y.o. female presenting for follow up visit s/p bariatric surgery.  Patient has been dealing with her ex- death, she has lost 5 lbs since 1 week ago, when he . Her weight loss is not that fast otherwise.  Also, her Adderall dose has been increased. Discussed with the patient the it also could have affected her appetite and caused more weight loss.   Latest Blood work has been reviewed and discussed with the patient.   Mary stated that her ex-'s  is scheduled for the same day as benign Hematology. Discussed that her Hgb is lowered (11.6)  and Iron Storages are empty. Recommended not to delay her benign Heme visit.   Patient has started her Iron Infusions. She had 2 out of 6 infusions. Patient states that she has started feeling better, with better energy. She stopped being cold all the time.     Current Weight: 144 lbs     Wt Readings from Last 1 Encounters:   25 66.3 kg (146 lb 2.6 oz)       There  are no diagnoses linked to this encounter.      Doing well with diet and exercise.   Current BMI: 24  Recommend attending support groups    Mary used to be managed with Wegovy 2.4 mg once weekly. Denied any adverse effects.  However, her last dose administration was > 1 month ago.  Will have to re-start from a small dose 0.5 mg once weekly.  Currently on Wegovy 1.7 mg once weekly.  Denies any adverse effects.  Denies personal or family hx of MTC or Pancreatitis.   Risks and benefits discussed.   Will continue Wegovy at 1.7 mg     Patient's mother had stroke recently. Patient is affected by that. 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 She has been exhausted with that.     Reviewed  CBC and Iron Studies since the beginning of Iron Infusions. Hgb, MCHC not improving, still at 11.  RDW keeps rising, now at 16.6. Iron storage is slowly improving    Pantoprazole is initiated  EGD ordered to exclude blood loss through upper GI.   Risk and benefits discussed.      Esophagogastroduodenoscopy (EGD)  Order# [id number]  Reading physician: Marcia Osborn MD MPH Ordering provider: STUART Parisi Study date: 4/18/25     Result Information    Status: Edited Result - FINAL (Exam End: 4/18/2025 08:56) Provider Status: Open     Result Text    Impression  The lower third of the esophagus appeared normal.  The gastrojejunostomy appeared normal.  The jejunum appeared normal.  Erythematous mucosa in the stomach; performed cold forceps biopsy gastritis        Findings  The lower third of the esophagus appeared normal.  Regular Z-line 35 cm from the incisors  The gastrojejunostomy appeared normal. 41 cm, 2.5 - 3 cm  The jejunum appeared normal.  Erythematous mucosa in the stomach; performed cold forceps biopsy gastritis       Surgical Pathology Exam: [id number]  Order: [id number]   Collected 4/18/2025 08:51       Status: Final result        Dx: S/P gastric bypass; Iron deficiency a...    Test Result Released: Yes (seen)    0 Result Notes       Component  Resulting Agency   FINAL DIAGNOSIS   A.  Gastric pouch, biopsy:  - Oxyntic mucosa with mild chronic inflammation and proton pump inhibitor-like effect.  - No H. pylori identified.     Electronically signed by Rosana Barnes MD PhD on 4/28/2025 at 1351 EDT          Patient is concerned regarding rashes and skin irritations in her lower abdomen skin folds.   Discussed with the patient referral to plastic surgery for a consult.  Rx Nystatin powder is placed.     Also discussed decreasing Wegovy dose to 1 mg for maintenance.  Will evaluate maintaining achieved weight loss goal with a smaller dose next visit at her annual visit.     Annual blood work is ordered    > 50% of time spent counseling on the importance of following recommended dietary modifications including: role of diet, low calorie and carbohydrate restrictions, limiting fast food and avoiding high sugary beverages.   Also discussed, the role of exercise with an ultimate goal of at least 250-300 minutes a week for weight loss and weight maintenance.    Follow-up in 1 month for 8 years annual visit.

## 2025-06-18 ENCOUNTER — OFFICE VISIT (OUTPATIENT)
Dept: DERMATOLOGY | Facility: CLINIC | Age: 45
End: 2025-06-18
Payer: COMMERCIAL

## 2025-06-18 DIAGNOSIS — L82.1 SEBORRHEIC KERATOSES: ICD-10-CM

## 2025-06-18 DIAGNOSIS — D18.01 CHERRY ANGIOMA: ICD-10-CM

## 2025-06-18 DIAGNOSIS — L90.5 SCAR CONDITIONS AND FIBROSIS OF SKIN: ICD-10-CM

## 2025-06-18 DIAGNOSIS — Z85.820 PERSONAL HISTORY OF MALIGNANT MELANOMA OF SKIN: ICD-10-CM

## 2025-06-18 DIAGNOSIS — Z12.83 SKIN CANCER SCREENING: ICD-10-CM

## 2025-06-18 DIAGNOSIS — D22.9 MULTIPLE BENIGN MELANOCYTIC NEVI: Primary | ICD-10-CM

## 2025-06-18 PROCEDURE — 99213 OFFICE O/P EST LOW 20 MIN: CPT | Performed by: DERMATOLOGY

## 2025-06-18 PROCEDURE — 1036F TOBACCO NON-USER: CPT | Performed by: DERMATOLOGY

## 2025-06-18 ASSESSMENT — DERMATOLOGY QUALITY OF LIFE (QOL) ASSESSMENT
ARE THERE EXCLUSIONS OR EXCEPTIONS FOR THE QUALITY OF LIFE ASSESSMENT: NO
RATE HOW BOTHERED YOU ARE BY SYMPTOMS OF YOUR SKIN PROBLEM (EG, ITCHING, STINGING BURNING, HURTING OR SKIN IRRITATION): 0 - NEVER BOTHERED
WHAT SINGLE SKIN CONDITION LISTED BELOW IS THE PATIENT ANSWERING THE QUALITY-OF-LIFE ASSESSMENT QUESTIONS ABOUT: NONE OF THE ABOVE
DATE THE QUALITY-OF-LIFE ASSESSMENT WAS COMPLETED: 67374
RATE HOW BOTHERED YOU ARE BY EFFECTS OF YOUR SKIN PROBLEMS ON YOUR ACTIVITIES (EG, GOING OUT, ACCOMPLISHING WHAT YOU WANT, WORK ACTIVITIES OR YOUR RELATIONSHIPS WITH OTHERS): 0 - NEVER BOTHERED
RATE HOW EMOTIONALLY BOTHERED YOU ARE BY YOUR SKIN PROBLEM (FOR EXAMPLE, WORRY, EMBARRASSMENT, FRUSTRATION): 0 - NEVER BOTHERED

## 2025-06-18 ASSESSMENT — DERMATOLOGY PATIENT ASSESSMENT
ARE YOU TRYING TO GET PREGNANT: NO
ARE YOU AN ORGAN TRANSPLANT RECIPIENT: NO
DO YOU USE A TANNING BED: YES, PREVIOUSLY
ARE YOU ON BIRTH CONTROL: NO
HAVE YOU HAD OR DO YOU HAVE A STAPH INFECTION: NO
DO YOU HAVE IRREGULAR MENSTRUAL CYCLES: NO
HAVE YOU HAD OR DO YOU HAVE VASCULAR DISEASE: NO
DO YOU USE SUNSCREEN: OCCASIONALLY

## 2025-06-18 ASSESSMENT — ITCH NUMERIC RATING SCALE: HOW SEVERE IS YOUR ITCHING?: 0

## 2025-06-18 ASSESSMENT — PATIENT GLOBAL ASSESSMENT (PGA): PATIENT GLOBAL ASSESSMENT: PATIENT GLOBAL ASSESSMENT:  1 - CLEAR

## 2025-06-18 NOTE — Clinical Note
- Well healed scar(s) at sites of prior skin cancer (ACMN x 2)  and/or dysplastic nevi.  - Sun protective behavior reviewed and encouraged including the use of over-the-counter sunscreen with SPF30+ daily (reapply every 1.5 hours when outdoors), UPF clothing, broad rimmed hats, sunglasses, and avoidance of midday sun. Home skin monitoring encouraged and how to monitor for skin cancer (changing or new moles, new rapidly growing or non-healing lesions) reviewed. Patient encouraged to call with interval concerns or changes.

## 2025-06-18 NOTE — PATIENT INSTRUCTIONS
Consider UPF clothing if difficult to reapply sunscreen with SPF 50 every 1.5 to 2 hours.     [employer] is a good UPF clothing company, order online

## 2025-06-18 NOTE — PROGRESS NOTES
Subjective     Mary Campa is a 44 y.o. female who presents for the following: Skin Check (Left bikini area).  Spot in bikini area has been there, but deserves a recheck.  Note she has lost a lot of weight in past few years and will likely have surgery with plastics for redundant skin removal.     Skin Cancer History  Biopsy Log Book  No skin cancers from Specimen Tracking.    Additional History  Left anterior proximal arm 9/2024 with ACMN s/p WLE with Dr. 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    The following portions of the chart were reviewed this encounter and updated as appropriate:       Specialty Problems          Dermatology Problems    Hemangioma of skin and subcutaneous tissue    Melanocytic nevi of other parts of face    Melanocytic nevi of scalp and neck    Melanocytic nevi of trunk    Melanocytic nevi of unspecified upper limb, including shoulder    Neoplasm of uncertain behavior of skin    Other melanin hyperpigmentation    Skin changes due to chronic exposure to nonionizing radiation, unspecified    Dry skin    Melanoma of back (Multi)     Past Medical History:  Mary Campa  has a past medical history of Anxiety disorder, unspecified, Bariatric surgery status (02/21/2018), Contact with and (suspected) exposure to covid-19 (11/30/2020), Encounter for other preprocedural examination (07/19/2017), Localized edema (08/29/2018), Morbid (severe) obesity due to excess calories (Multi) (09/20/2017), Noninfective gastroenteritis and colitis, unspecified (08/29/2018), Obesity, unspecified (04/26/2017), Other specified cough (10/17/2020), Pain in right leg  (2018), Personal history of other diseases of the circulatory system (2018), Personal history of other diseases of the digestive system (2018), Personal history of other diseases of the nervous system and sense organs (2018), Personal history of other diseases of the respiratory system (10/17/2020), Personal history of other diseases of the respiratory system (2015), Personal history of pneumonia (recurrent) (2018), Thiamine deficiency, unspecified (2018), and Xerosis cutis (2018).    Past Surgical History:  Mary Campa  has a past surgical history that includes  section, low transverse (2017) and Gastric bypass (2018).    Family History:  Patient family history is not on file.    Social History:  Mary Campa  reports that she has never smoked. She has never used smokeless tobacco. She reports that she does not drink alcohol and does not use drugs.    Allergies:  Latex and Morphine    Current Medications / CAM's:  Current Medications[1]     Objective   Well appearing patient in no apparent distress; mood and affect are within normal limits.    A full examination was performed including scalp, head, eyes, ears, nose, lips, neck, chest, axillae, abdomen, back, buttocks, bilateral upper extremities, bilateral lower extremities, hands, feet, fingers, toes, fingernails, and toenails. Gluteal cleft exam performed. She declined vulvar/perineal exam. All findings within normal limits unless otherwise noted below.    Bilateral anterior/posterior cervical neck, supraclavicular, parotid, axillary, submental/mandibular lymph node basins examined without lymphadenopathy.     - scattered regular brown macules and papules  - Scattered waxy tan/grey/brown papules with horn cysts  - scattered small bright red papules and macules  - Well healed scar at prior treatment sites without visual or palpable evidence of recurrence.        Assessment/Plan    MULTIPLE BENIGN MELANOCYTIC NEVI  Generalized  Benign melanocytic nevi  - Discussed benign nature and that no treatment is necessary unless it becomes painful or increases in size. Patient opts for clinical monitoring at this time.    - Sun protective behavior reviewed and encouraged including the use of over-the-counter sunscreen with SPF30+ daily (reapply every 1.5 hours when outdoors), UPF clothing, broad rimmed hats, sunglasses, and avoidance of midday sun. Home skin monitoring encouraged and how to monitor for skin cancer (changing or new moles, new rapidly growing or non-healing lesions) reviewed. Patient encouraged to call with interval concerns or changes.    SEBORRHEIC KERATOSES  Generalized  Seborrheic keratosis (-es)  - Discussed benign nature and that no treatment is necessary unless it becomes painful or increases in size. Patient opts for clinical monitoring at this time.    MELO ANGIOMA  Generalized  Cherry angioma(s)  - Discussed benign nature and that no treatment is necessary unless it becomes painful or increases in size. Patient opts for clinical monitoring at this time.    SCAR CONDITIONS AND FIBROSIS OF SKIN  Generalized  - Well healed scar(s) at sites of prior skin cancer (ACMN x 2)  and/or dysplastic nevi.  - Sun protective behavior reviewed and encouraged including the use of over-the-counter sunscreen with SPF30+ daily (reapply every 1.5 hours when outdoors), UPF clothing, broad rimmed hats, sunglasses, and avoidance of midday sun. Home skin monitoring encouraged and how to monitor for skin cancer (changing or new moles, new rapidly growing or non-healing lesions) reviewed. Patient encouraged to call with interval concerns or changes.     SKIN CANCER SCREENING      Related Procedures  Follow Up In Dermatology - Established Patient  PERSONAL HISTORY OF MALIGNANT MELANOMA OF SKIN    She endorses being uptodate on all age appropriate malignancy screenings including mammogram, pap/pelvic,  colonoscopy, dental, eye exams. She does home skin examinations regularly. Encouraged better sun protection as she is tan today. Gave recommendations for SPF 50 reapply every 1.5-2 hours or UV protective clothing like coolibar.     6 mos fbse  Otilia Monte MD             [1]   Current Outpatient Medications:     ascorbic acid (Vitamin C) 500 mg tablet, Take 1 tablet (500 mg) by mouth once daily., Disp: 60 tablet, Rfl: 1    biotin 1 mg tablet, Take 1 tablet (1 mg) by mouth once daily., Disp: , Rfl:     calcium carbonate 600 mg calcium (1,500 mg) tablet, Take 1 tablet (1,500 mg) by mouth once daily., Disp: , Rfl:     cyanocobalamin (Vitamin B-12) 1,000 mcg/mL injection, Inject 1 mL (1,000 mcg) into the muscle every 30 (thirty) days., Disp: 1 mL, Rfl: 11    methylphenidate LA (Ritalin LA) 20 mg 24 hr capsule, Take 1 capsule (20 mg) by mouth early in the morning.., Disp: , Rfl:     nystatin (Mycostatin) 100,000 unit/gram powder, Apply 1 Application topically 2 times a day., Disp: 30 g, Rfl: 1    pantoprazole (ProtoNix) 40 mg EC tablet, Take 1 tablet (40 mg) by mouth once daily in the morning. Take before meals. Do not crush, chew, or split., Disp: 30 tablet, Rfl: 11    semaglutide, weight loss, (Wegovy) 1 mg/0.5 mL pen injector, Inject 1 mg under the skin 1 (one) time per week. Inject 1 mg subcutaneously every week, Disp: 2 mL, Rfl: 1    methylphenidate (Ritalin) 10 mg tablet, Take by mouth once daily., Disp: , Rfl:

## 2025-06-18 NOTE — Clinical Note
Left anterior proximal arm 9/2024 with ACMN s/p WLE with Dr. 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

## 2025-06-19 LAB
25(OH)D3+25(OH)D2 SERPL-MCNC: 59 NG/ML (ref 30–100)
ALBUMIN SERPL-MCNC: 4.2 G/DL (ref 3.6–5.1)
ALP SERPL-CCNC: 46 U/L (ref 31–125)
ALT SERPL-CCNC: 20 U/L (ref 6–29)
ANION GAP SERPL CALCULATED.4IONS-SCNC: 8 MMOL/L (CALC) (ref 7–17)
AST SERPL-CCNC: 19 U/L (ref 10–35)
BILIRUB SERPL-MCNC: 0.4 MG/DL (ref 0.2–1.2)
BUN SERPL-MCNC: 12 MG/DL (ref 7–25)
CALCIUM SERPL-MCNC: 9 MG/DL (ref 8.6–10.2)
CHLORIDE SERPL-SCNC: 105 MMOL/L (ref 98–110)
CO2 SERPL-SCNC: 27 MMOL/L (ref 20–32)
CREAT SERPL-MCNC: 0.54 MG/DL (ref 0.5–0.99)
EGFRCR SERPLBLD CKD-EPI 2021: 116 ML/MIN/1.73M2
ERYTHROCYTE [DISTWIDTH] IN BLOOD BY AUTOMATED COUNT: 13.4 % (ref 11–15)
FERRITIN SERPL-MCNC: 37 NG/ML (ref 16–232)
FOLATE SERPL-MCNC: 15.7 NG/ML
GLUCOSE SERPL-MCNC: 74 MG/DL (ref 65–139)
HCT VFR BLD AUTO: 39.2 % (ref 35–45)
HGB BLD-MCNC: 12.2 G/DL (ref 11.7–15.5)
IRON SATN MFR SERPL: 24 % (CALC) (ref 16–45)
IRON SERPL-MCNC: 93 MCG/DL (ref 40–190)
MCH RBC QN AUTO: 28.8 PG (ref 27–33)
MCHC RBC AUTO-ENTMCNC: 31.1 G/DL (ref 32–36)
MCV RBC AUTO: 92.5 FL (ref 80–100)
PLATELET # BLD AUTO: 238 THOUSAND/UL (ref 140–400)
PMV BLD REES-ECKER: 11.9 FL (ref 7.5–12.5)
POTASSIUM SERPL-SCNC: 4.4 MMOL/L (ref 3.5–5.3)
PROT SERPL-MCNC: 6.5 G/DL (ref 6.1–8.1)
PTH-INTACT SERPL-MCNC: 40 PG/ML (ref 16–77)
PYRIDOXAL PHOS SERPL-MCNC: NORMAL UG/L
RBC # BLD AUTO: 4.24 MILLION/UL (ref 3.8–5.1)
SODIUM SERPL-SCNC: 140 MMOL/L (ref 135–146)
TIBC SERPL-MCNC: 391 MCG/DL (CALC) (ref 250–450)
TSH SERPL-ACNC: 1.14 MIU/L
VIT B1 BLD-SCNC: NORMAL NMOL/L
VIT B12 SERPL-MCNC: 297 PG/ML (ref 200–1100)
WBC # BLD AUTO: 4.6 THOUSAND/UL (ref 3.8–10.8)

## 2025-06-20 DIAGNOSIS — Z90.3 INTESTINAL MALABSORPTION FOLLOWING GASTRECTOMY (HHS-HCC): ICD-10-CM

## 2025-06-20 DIAGNOSIS — Z98.84 S/P GASTRIC BYPASS: Primary | ICD-10-CM

## 2025-06-20 DIAGNOSIS — K91.2 INTESTINAL MALABSORPTION FOLLOWING GASTRECTOMY (HHS-HCC): ICD-10-CM

## 2025-06-24 ENCOUNTER — TELEPHONE (OUTPATIENT)
Dept: SURGERY | Facility: HOSPITAL | Age: 45
End: 2025-06-24
Payer: COMMERCIAL

## 2025-06-24 DIAGNOSIS — Z98.84 WEIGHT GAIN FOLLOWING GASTRIC BYPASS SURGERY: ICD-10-CM

## 2025-06-24 DIAGNOSIS — R63.2 POLYPHAGIA: ICD-10-CM

## 2025-06-24 DIAGNOSIS — R63.5 WEIGHT GAIN FOLLOWING GASTRIC BYPASS SURGERY: ICD-10-CM

## 2025-06-24 DIAGNOSIS — Z98.84 S/P GASTRIC BYPASS: Primary | ICD-10-CM

## 2025-06-24 LAB
25(OH)D3+25(OH)D2 SERPL-MCNC: 59 NG/ML (ref 30–100)
ALBUMIN SERPL-MCNC: 4.2 G/DL (ref 3.6–5.1)
ALP SERPL-CCNC: 46 U/L (ref 31–125)
ALT SERPL-CCNC: 20 U/L (ref 6–29)
ANION GAP SERPL CALCULATED.4IONS-SCNC: 8 MMOL/L (CALC) (ref 7–17)
AST SERPL-CCNC: 19 U/L (ref 10–35)
BILIRUB SERPL-MCNC: 0.4 MG/DL (ref 0.2–1.2)
BUN SERPL-MCNC: 12 MG/DL (ref 7–25)
CALCIUM SERPL-MCNC: 9 MG/DL (ref 8.6–10.2)
CHLORIDE SERPL-SCNC: 105 MMOL/L (ref 98–110)
CO2 SERPL-SCNC: 27 MMOL/L (ref 20–32)
CREAT SERPL-MCNC: 0.54 MG/DL (ref 0.5–0.99)
EGFRCR SERPLBLD CKD-EPI 2021: 116 ML/MIN/1.73M2
ERYTHROCYTE [DISTWIDTH] IN BLOOD BY AUTOMATED COUNT: 13.4 % (ref 11–15)
FERRITIN SERPL-MCNC: 37 NG/ML (ref 16–232)
FOLATE SERPL-MCNC: 15.7 NG/ML
GLUCOSE SERPL-MCNC: 74 MG/DL (ref 65–139)
HCT VFR BLD AUTO: 39.2 % (ref 35–45)
HGB BLD-MCNC: 12.2 G/DL (ref 11.7–15.5)
IRON SATN MFR SERPL: 24 % (CALC) (ref 16–45)
IRON SERPL-MCNC: 93 MCG/DL (ref 40–190)
MCH RBC QN AUTO: 28.8 PG (ref 27–33)
MCHC RBC AUTO-ENTMCNC: 31.1 G/DL (ref 32–36)
MCV RBC AUTO: 92.5 FL (ref 80–100)
PLATELET # BLD AUTO: 238 THOUSAND/UL (ref 140–400)
PMV BLD REES-ECKER: 11.9 FL (ref 7.5–12.5)
POTASSIUM SERPL-SCNC: 4.4 MMOL/L (ref 3.5–5.3)
PROT SERPL-MCNC: 6.5 G/DL (ref 6.1–8.1)
PTH-INTACT SERPL-MCNC: 40 PG/ML (ref 16–77)
PYRIDOXAL PHOS SERPL-MCNC: 35.3 NG/ML (ref 2.1–21.7)
RBC # BLD AUTO: 4.24 MILLION/UL (ref 3.8–5.1)
SODIUM SERPL-SCNC: 140 MMOL/L (ref 135–146)
TIBC SERPL-MCNC: 391 MCG/DL (CALC) (ref 250–450)
TSH SERPL-ACNC: 1.14 MIU/L
VIT B1 BLD-SCNC: NORMAL NMOL/L
VIT B12 SERPL-MCNC: 297 PG/ML (ref 200–1100)
WBC # BLD AUTO: 4.6 THOUSAND/UL (ref 3.8–10.8)

## 2025-06-24 RX ORDER — SEMAGLUTIDE 1.7 MG/.75ML
1.7 INJECTION, SOLUTION SUBCUTANEOUS
Qty: 3 ML | Refills: 2 | Status: SHIPPED | OUTPATIENT
Start: 2025-06-24

## 2025-06-24 NOTE — TELEPHONE ENCOUNTER
Patient messaged about her Wegovy 1 mg once weekly for the maintenance dose is ineffective. Patient's appetite is absolutely not controlled. She would like to have her maintenance Wegovy dose at 1.7 mg once weekly. Order for Wegovy 1.7 mg is placed.

## 2025-06-25 DIAGNOSIS — Z98.84 S/P GASTRIC BYPASS: ICD-10-CM

## 2025-06-25 DIAGNOSIS — K91.2 INTESTINAL MALABSORPTION FOLLOWING GASTRECTOMY (HHS-HCC): ICD-10-CM

## 2025-06-25 DIAGNOSIS — Z90.3 INTESTINAL MALABSORPTION FOLLOWING GASTRECTOMY (HHS-HCC): ICD-10-CM

## 2025-06-25 DIAGNOSIS — D51.8 OTHER VITAMIN B12 DEFICIENCY ANEMIA: ICD-10-CM

## 2025-06-25 RX ORDER — CYANOCOBALAMIN 1000 UG/ML
1000 INJECTION, SOLUTION INTRAMUSCULAR; SUBCUTANEOUS
Qty: 1 ML | Refills: 11 | Status: SHIPPED | OUTPATIENT
Start: 2025-06-25 | End: 2026-06-20

## 2025-07-08 ENCOUNTER — APPOINTMENT (OUTPATIENT)
Dept: SURGERY | Facility: HOSPITAL | Age: 45
End: 2025-07-08
Payer: COMMERCIAL

## 2025-07-23 DIAGNOSIS — Z98.84 WEIGHT GAIN FOLLOWING GASTRIC BYPASS SURGERY: ICD-10-CM

## 2025-07-23 DIAGNOSIS — R63.2 POLYPHAGIA: ICD-10-CM

## 2025-07-23 DIAGNOSIS — R63.5 WEIGHT GAIN FOLLOWING GASTRIC BYPASS SURGERY: ICD-10-CM

## 2025-07-23 DIAGNOSIS — Z98.84 S/P GASTRIC BYPASS: ICD-10-CM

## 2025-07-23 RX ORDER — SEMAGLUTIDE 1.7 MG/.75ML
1.7 INJECTION, SOLUTION SUBCUTANEOUS
Qty: 3 ML | Refills: 2 | Status: SHIPPED | OUTPATIENT
Start: 2025-07-27

## 2025-08-12 ENCOUNTER — APPOINTMENT (OUTPATIENT)
Dept: SURGERY | Facility: HOSPITAL | Age: 45
End: 2025-08-12
Payer: COMMERCIAL

## 2025-08-22 ENCOUNTER — TELEMEDICINE (OUTPATIENT)
Dept: SURGERY | Facility: HOSPITAL | Age: 45
End: 2025-08-22
Payer: COMMERCIAL

## 2025-08-22 VITALS — BODY MASS INDEX: 24.16 KG/M2 | WEIGHT: 145 LBS | HEIGHT: 65 IN

## 2025-08-22 DIAGNOSIS — E65 ABDOMINAL PANNICULUS, SYMPTOMATIC: ICD-10-CM

## 2025-08-22 DIAGNOSIS — R63.5 WEIGHT GAIN FOLLOWING GASTRIC BYPASS SURGERY: Primary | ICD-10-CM

## 2025-08-22 DIAGNOSIS — Z98.84 S/P GASTRIC BYPASS: ICD-10-CM

## 2025-08-22 DIAGNOSIS — R63.2 POLYPHAGIA: ICD-10-CM

## 2025-08-22 DIAGNOSIS — Z98.84 WEIGHT GAIN FOLLOWING GASTRIC BYPASS SURGERY: Primary | ICD-10-CM

## 2025-08-22 PROCEDURE — 99214 OFFICE O/P EST MOD 30 MIN: CPT | Mod: 95

## 2025-08-22 PROCEDURE — 3008F BODY MASS INDEX DOCD: CPT

## 2025-08-22 PROCEDURE — 99214 OFFICE O/P EST MOD 30 MIN: CPT

## 2025-09-08 ENCOUNTER — APPOINTMENT (OUTPATIENT)
Dept: PLASTIC SURGERY | Facility: CLINIC | Age: 45
End: 2025-09-08
Payer: COMMERCIAL

## 2025-12-17 ENCOUNTER — APPOINTMENT (OUTPATIENT)
Dept: DERMATOLOGY | Facility: CLINIC | Age: 45
End: 2025-12-17
Payer: COMMERCIAL